# Patient Record
Sex: FEMALE | Race: WHITE | NOT HISPANIC OR LATINO | Employment: OTHER | URBAN - METROPOLITAN AREA
[De-identification: names, ages, dates, MRNs, and addresses within clinical notes are randomized per-mention and may not be internally consistent; named-entity substitution may affect disease eponyms.]

---

## 2020-01-15 ENCOUNTER — TRANSCRIBE ORDERS (OUTPATIENT)
Dept: ADMINISTRATIVE | Facility: HOSPITAL | Age: 55
End: 2020-01-15

## 2020-01-15 DIAGNOSIS — M81.0 OSTEOPOROSIS, SENILE: Primary | ICD-10-CM

## 2020-01-15 DIAGNOSIS — Z12.39 BREAST SCREENING: ICD-10-CM

## 2020-01-15 DIAGNOSIS — R92.2 DENSE BREAST: ICD-10-CM

## 2020-02-11 ENCOUNTER — HOSPITAL ENCOUNTER (OUTPATIENT)
Dept: ULTRASOUND IMAGING | Facility: HOSPITAL | Age: 55
Discharge: HOME | End: 2020-02-11

## 2020-02-11 ENCOUNTER — HOSPITAL ENCOUNTER (OUTPATIENT)
Dept: MAMMOGRAPHY | Facility: HOSPITAL | Age: 55
Discharge: HOME | End: 2020-02-11
Admitting: PHYSICIAN ASSISTANT

## 2020-02-11 ENCOUNTER — APPOINTMENT (OUTPATIENT)
Dept: BONE DENSITY | Facility: HOSPITAL | Age: 55
End: 2020-02-11

## 2020-02-11 DIAGNOSIS — M81.0 OSTEOPOROSIS, SENILE: ICD-10-CM

## 2020-02-11 DIAGNOSIS — Z12.39 BREAST SCREENING: ICD-10-CM

## 2020-02-11 DIAGNOSIS — R92.2 DENSE BREAST: ICD-10-CM

## 2020-02-11 PROCEDURE — 76641 ULTRASOUND BREAST COMPLETE: CPT

## 2020-02-11 PROCEDURE — 77067 SCR MAMMO BI INCL CAD: CPT

## 2020-02-11 PROCEDURE — 77080 DXA BONE DENSITY AXIAL: CPT

## 2020-02-11 PROCEDURE — 77063 BREAST TOMOSYNTHESIS BI: CPT

## 2020-12-22 ENCOUNTER — TELEPHONE (OUTPATIENT)
Dept: GASTROENTEROLOGY | Facility: CLINIC | Age: 55
End: 2020-12-22

## 2020-12-30 ENCOUNTER — PREP FOR SURGERY (OUTPATIENT)
Dept: OTHER | Facility: HOSPITAL | Age: 55
End: 2020-12-30

## 2020-12-30 DIAGNOSIS — Z12.11 ENCOUNTER FOR SCREENING FOR MALIGNANT NEOPLASM OF COLON: Primary | ICD-10-CM

## 2020-12-30 NOTE — TELEPHONE ENCOUNTER
RETURN CALL FROM PATIENT.  SCHEDULED AT [place] 04/23/2021 AT 9:30AM  - ARRIVE 8:30AM.  E-MAIL INSTRUCTIONS [email or web address].    COVID TEST ON 04/21/2021, WILL CALL WITH TIME.  NEED TO SELF QUARANTINE UNTIL AFTER PROCEDURE.  SHE UNDERSTANDS.

## 2020-12-31 PROBLEM — Z12.11 ENCOUNTER FOR SCREENING FOR MALIGNANT NEOPLASM OF COLON: Status: ACTIVE | Noted: 2020-12-31

## 2021-01-12 ENCOUNTER — TRANSCRIBE ORDERS (OUTPATIENT)
Dept: ADMINISTRATIVE | Facility: HOSPITAL | Age: 56
End: 2021-01-12

## 2021-01-12 DIAGNOSIS — R92.2 DENSE BREAST: Primary | ICD-10-CM

## 2021-02-12 ENCOUNTER — HOSPITAL ENCOUNTER (OUTPATIENT)
Dept: MAMMOGRAPHY | Facility: HOSPITAL | Age: 56
Discharge: HOME | End: 2021-02-12

## 2021-02-12 ENCOUNTER — HOSPITAL ENCOUNTER (OUTPATIENT)
Dept: ULTRASOUND IMAGING | Facility: HOSPITAL | Age: 56
Discharge: HOME | End: 2021-02-12

## 2021-02-12 DIAGNOSIS — R92.2 DENSE BREAST: ICD-10-CM

## 2021-02-12 PROCEDURE — 76641 ULTRASOUND BREAST COMPLETE: CPT

## 2021-02-12 PROCEDURE — G0279 TOMOSYNTHESIS, MAMMO: HCPCS

## 2021-02-12 PROCEDURE — 77066 DX MAMMO INCL CAD BI: CPT

## 2021-04-06 ENCOUNTER — TRANSCRIBE ORDERS (OUTPATIENT)
Dept: LAB | Facility: HOSPITAL | Age: 56
End: 2021-04-06

## 2021-04-06 DIAGNOSIS — Z01.818 PREOP EXAMINATION: Primary | ICD-10-CM

## 2021-04-21 ENCOUNTER — LAB (OUTPATIENT)
Dept: LAB | Facility: HOSPITAL | Age: 56
End: 2021-04-21

## 2021-04-21 DIAGNOSIS — Z01.818 PREOP EXAMINATION: ICD-10-CM

## 2021-04-21 LAB — SARS-COV-2 RNA PNL SPEC NAA+PROBE: NOT DETECTED

## 2021-04-21 PROCEDURE — C9803 HOPD COVID-19 SPEC COLLECT: HCPCS | Performed by: OBSTETRICS & GYNECOLOGY

## 2021-04-21 PROCEDURE — 87635 SARS-COV-2 COVID-19 AMP PRB: CPT | Performed by: OBSTETRICS & GYNECOLOGY

## 2021-04-22 ENCOUNTER — ANESTHESIA EVENT (OUTPATIENT)
Dept: PERIOP | Facility: HOSPITAL | Age: 56
End: 2021-04-22

## 2021-04-23 ENCOUNTER — HOSPITAL ENCOUNTER (OUTPATIENT)
Facility: HOSPITAL | Age: 56
Setting detail: HOSPITAL OUTPATIENT SURGERY
Discharge: HOME | End: 2021-04-23
Attending: INTERNAL MEDICINE | Admitting: INTERNAL MEDICINE

## 2021-04-23 ENCOUNTER — ANESTHESIA (OUTPATIENT)
Dept: PERIOP | Facility: HOSPITAL | Age: 56
End: 2021-04-23

## 2021-04-23 VITALS
BODY MASS INDEX: 20.89 KG/M2 | RESPIRATION RATE: 16 BRPM | OXYGEN SATURATION: 98 % | HEART RATE: 63 BPM | TEMPERATURE: 98 F | SYSTOLIC BLOOD PRESSURE: 112 MMHG | HEIGHT: 66 IN | DIASTOLIC BLOOD PRESSURE: 63 MMHG | WEIGHT: 130 LBS

## 2021-04-23 DIAGNOSIS — Z12.11 ENCOUNTER FOR SCREENING FOR MALIGNANT NEOPLASM OF COLON: ICD-10-CM

## 2021-04-23 PROCEDURE — 25010000002 PROPOFOL 10 MG/ML EMULSION: Performed by: NURSE ANESTHETIST, CERTIFIED REGISTERED

## 2021-04-23 PROCEDURE — 88305 TISSUE EXAM BY PATHOLOGIST: CPT | Performed by: INTERNAL MEDICINE

## 2021-04-23 PROCEDURE — 45380 COLONOSCOPY AND BIOPSY: CPT | Performed by: INTERNAL MEDICINE

## 2021-04-23 PROCEDURE — 45385 COLONOSCOPY W/LESION REMOVAL: CPT | Performed by: INTERNAL MEDICINE

## 2021-04-23 RX ORDER — GLYCOPYRROLATE 0.2 MG/ML
INJECTION INTRAMUSCULAR; INTRAVENOUS AS NEEDED
Status: DISCONTINUED | OUTPATIENT
Start: 2021-04-23 | End: 2021-04-23 | Stop reason: SURG

## 2021-04-23 RX ORDER — ESCITALOPRAM OXALATE 20 MG/1
20 TABLET ORAL DAILY
COMMUNITY

## 2021-04-23 RX ORDER — SODIUM CHLORIDE 0.9 % (FLUSH) 0.9 %
10 SYRINGE (ML) INJECTION EVERY 12 HOURS SCHEDULED
Status: DISCONTINUED | OUTPATIENT
Start: 2021-04-23 | End: 2021-04-23 | Stop reason: HOSPADM

## 2021-04-23 RX ORDER — LIDOCAINE HYDROCHLORIDE 20 MG/ML
INJECTION, SOLUTION INFILTRATION; PERINEURAL AS NEEDED
Status: DISCONTINUED | OUTPATIENT
Start: 2021-04-23 | End: 2021-04-23 | Stop reason: SURG

## 2021-04-23 RX ORDER — MAGNESIUM HYDROXIDE 1200 MG/15ML
LIQUID ORAL AS NEEDED
Status: DISCONTINUED | OUTPATIENT
Start: 2021-04-23 | End: 2021-04-23 | Stop reason: HOSPADM

## 2021-04-23 RX ORDER — SODIUM CHLORIDE 0.9 % (FLUSH) 0.9 %
10 SYRINGE (ML) INJECTION AS NEEDED
Status: DISCONTINUED | OUTPATIENT
Start: 2021-04-23 | End: 2021-04-23 | Stop reason: HOSPADM

## 2021-04-23 RX ORDER — PROPOFOL 10 MG/ML
VIAL (ML) INTRAVENOUS AS NEEDED
Status: DISCONTINUED | OUTPATIENT
Start: 2021-04-23 | End: 2021-04-23 | Stop reason: SURG

## 2021-04-23 RX ORDER — SODIUM CHLORIDE, SODIUM LACTATE, POTASSIUM CHLORIDE, CALCIUM CHLORIDE 600; 310; 30; 20 MG/100ML; MG/100ML; MG/100ML; MG/100ML
9 INJECTION, SOLUTION INTRAVENOUS CONTINUOUS PRN
Status: DISCONTINUED | OUTPATIENT
Start: 2021-04-23 | End: 2021-04-23 | Stop reason: HOSPADM

## 2021-04-23 RX ORDER — LIDOCAINE HYDROCHLORIDE 10 MG/ML
0.5 INJECTION, SOLUTION EPIDURAL; INFILTRATION; INTRACAUDAL; PERINEURAL ONCE AS NEEDED
Status: DISCONTINUED | OUTPATIENT
Start: 2021-04-23 | End: 2021-04-23 | Stop reason: HOSPADM

## 2021-04-23 RX ORDER — SODIUM CHLORIDE 9 MG/ML
40 INJECTION, SOLUTION INTRAVENOUS AS NEEDED
Status: DISCONTINUED | OUTPATIENT
Start: 2021-04-23 | End: 2021-04-23 | Stop reason: HOSPADM

## 2021-04-23 RX ADMIN — SODIUM CHLORIDE, POTASSIUM CHLORIDE, SODIUM LACTATE AND CALCIUM CHLORIDE 9 ML/HR: 600; 310; 30; 20 INJECTION, SOLUTION INTRAVENOUS at 08:53

## 2021-04-23 RX ADMIN — PROPOFOL 50 MG: 10 INJECTION, EMULSION INTRAVENOUS at 09:08

## 2021-04-23 RX ADMIN — PROPOFOL 50 MG: 10 INJECTION, EMULSION INTRAVENOUS at 09:21

## 2021-04-23 RX ADMIN — PROPOFOL 50 MG: 10 INJECTION, EMULSION INTRAVENOUS at 09:04

## 2021-04-23 RX ADMIN — PROPOFOL 50 MG: 10 INJECTION, EMULSION INTRAVENOUS at 09:25

## 2021-04-23 RX ADMIN — LIDOCAINE HYDROCHLORIDE 100 MG: 20 INJECTION, SOLUTION INFILTRATION; PERINEURAL at 09:03

## 2021-04-23 RX ADMIN — PROPOFOL 50 MG: 10 INJECTION, EMULSION INTRAVENOUS at 09:10

## 2021-04-23 RX ADMIN — PROPOFOL 50 MG: 10 INJECTION, EMULSION INTRAVENOUS at 09:17

## 2021-04-23 RX ADMIN — PROPOFOL 50 MG: 10 INJECTION, EMULSION INTRAVENOUS at 09:03

## 2021-04-23 RX ADMIN — PROPOFOL 50 MG: 10 INJECTION, EMULSION INTRAVENOUS at 09:13

## 2021-04-23 RX ADMIN — GLYCOPYRROLATE 0.2 MG: 0.2 INJECTION INTRAMUSCULAR; INTRAVENOUS at 09:03

## 2021-04-23 NOTE — ANESTHESIA PREPROCEDURE EVALUATION
Anesthesia Evaluation     Patient summary reviewed and Nursing notes reviewed   history of anesthetic complications: PONV  NPO Solid Status: > 8 hours  NPO Liquid Status: > 8 hours           Airway   Mallampati: II  TM distance: <3 FB  Neck ROM: full  No difficulty expected  Dental - normal exam     Pulmonary - negative pulmonary ROS and normal exam   Cardiovascular - normal exam  Exercise tolerance: good (4-7 METS)    (+) valvular problems/murmurs murmur,       Neuro/Psych- negative ROS  GI/Hepatic/Renal/Endo    (+)  GERD poorly controlled,      Musculoskeletal (-) negative ROS    Abdominal  - normal exam   Substance History - negative use     OB/GYN negative ob/gyn ROS         Other      history of cancer remission                    Anesthesia Plan    ASA 2     MAC     intravenous induction     Anesthetic plan, all risks, benefits, and alternatives have been provided, discussed and informed consent has been obtained with: patient.  Use of blood products discussed with patient  Consented to blood products.

## 2021-04-23 NOTE — ANESTHESIA POSTPROCEDURE EVALUATION
Patient: Taras Tovar    Procedure Summary     Date: 04/23/21 Room / Location: [facility] ENDOSCOPY 1 /  [facility] OR    Anesthesia Start: 0901 Anesthesia Stop: 0929    Procedure: COLONOSCOPY with polypectomy (N/A ) Diagnosis:       Encounter for screening for malignant neoplasm of colon      Colon polyp      Diverticulosis large intestine w/o perforation or abscess w/bleeding      (Encounter for screening for malignant neoplasm of colon [Z12.11])    Surgeons: Cr Morrison MD Provider: Roque Calvillo CRNA    Anesthesia Type: MAC ASA Status: 2          Anesthesia Type: MAC    Vitals  No vitals data found for the desired time range.          Post Anesthesia Care and Evaluation    Patient location during evaluation: bedside  Patient participation: complete - patient participated  Level of consciousness: anxious  Pain score: 0  Pain management: adequate  Airway patency: patent  Anesthetic complications: No anesthetic complications  PONV Status: none  Cardiovascular status: acceptable  Respiratory status: acceptable  Hydration status: acceptable

## 2021-04-23 NOTE — H&P
Patient Care Team:  Jarrell Gutierrez DO as PCP - General (Family Medicine)    CHIEF COMPLAINT: Screening CRC    HISTORY OF PRESENT ILLNESS:  First exam and no family history    No past medical history on file.  Past Surgical History:   Procedure Laterality Date   • BREAST BIOPSY Right     1990s     Family History   Problem Relation Age of Onset   • Breast cancer Maternal Aunt    • Breast cancer Paternal Aunt      Social History     Tobacco Use   • Smoking status: Not on file   Substance Use Topics   • Alcohol use: Not on file   • Drug use: Not on file     Medications Prior to Admission   Medication Sig Dispense Refill Last Dose   • butalbital-acetaminophen-caffeine (FIORICET, ESGIC) -40 MG per tablet TAKE ONE TABLET BY MOUTH TWICE DAILY AS NEEDED 45 tablet 0      Allergies:  Patient has no allergy information on record.    REVIEW OF SYSTEMS:  Please see the above history of present illness for pertinent positives and negatives.  The remainder of the patient's systems have been reviewed and are negative.     Vital Signs            Physical Exam:  Physical Exam   Constitutional: Patient appears well-developed and well-nourished and in no acute distress   HEENT:   Head: Normocephalic and atraumatic.   Eyes:  Pupils are equal, round, and reactive to light. EOM are intact. Sclerae are anicteric and non-injected.  Mouth and Throat: Patient has moist mucous membranes. Oropharynx is clear of any erythema or exudate.     Neck: Neck supple. No JVD present. No thyromegaly present. No lymphadenopathy present.  Cardiovascular: Regular rate, regular rhythm, S1 normal and S2 normal.  Exam reveals no gallop and no friction rub.  No murmur heard.  Pulmonary/Chest: Lungs are clear to auscultation bilaterally. No respiratory distress. No wheezes. No rhonchi. No rales.   Abdominal: Soft. Bowel sounds are normal. No distension and no mass. There is no hepatosplenomegaly. There is no tenderness.   Musculoskeletal: Normal Muscle  tone  Extremities: No edema. Pulses are palpable in all 4 extremities.  Neurological: Patient is alert and oriented to person, place, and time. Cranial nerves II-XII are grossly intact with no focal deficits.  Skin: Skin is warm. No rash noted. Nails show no clubbing.  No cyanosis or erythema.    Debilities/Disabilities Identified: None  Emotional Behavior: Appropriate     Results Review:   I reviewed the patient's new clinical results.    Lab Results (most recent)     None          Imaging Results (Most Recent)     None        reviewed    ECG/EMG Results (most recent)     None        reviewed    Assessment/Plan   Screening CRC/  colonoscopy      I discussed the patient's findings and my recommendations with patient.     Cr Morrison MD  04/23/21  08:32 EDT    Time: 10 min prior to procedure.

## 2021-04-23 NOTE — BRIEF OP NOTE
COLONOSCOPY  Progress Note    Taras Tovar  4/23/2021    Pre-op Diagnosis:   Encounter for screening for malignant neoplasm of colon [Z12.11]       Post-Op Diagnosis Codes:     * Encounter for screening for malignant neoplasm of colon [Z12.11]     * Colon polyp [K63.5]     * Diverticulosis large intestine w/o perforation or abscess w/bleeding [K57.31]    Procedure/CPT® Codes:        Procedure(s):  COLONOSCOPY with polypectomy    Surgeon(s):  Cr Morrison MD    Anesthesia: Monitored Anesthesia Care    Staff:   Circulator: Rupal Yeung RN  Scrub Person: Yudi Arias         Estimated Blood Loss: none    Urine Voided: * No values recorded between 4/23/2021  8:59 AM and 4/23/2021  9:26 AM *    Specimens:                Specimens     ID Source Type Tests Collected By Collected At Frozen?    A Large Intestine, Cecum Polyp · TISSUE PATHOLOGY EXAM   Cr Morrison MD 4/23/21 0916     Description: cecal polyp    This specimen was not marked as sent.    B Large Intestine, Right / Ascending Colon Polyp · TISSUE PATHOLOGY EXAM   Cr Morrison MD 4/23/21 0920     Description: ascending colon polyp    This specimen was not marked as sent.    C Large Intestine, Transverse Colon Polyp · TISSUE PATHOLOGY EXAM   Cr Morrison MD 4/23/21 0923     Description: transverse colon polyp    This specimen was not marked as sent.                Drains: * No LDAs found *    Findings: Colon to TI good Prep  Pan-Colonic Diverticulosis  Polyps-3-Cold snare,Biopsy    Complications: none          Cr Morrison MD     Date: 4/23/2021  Time: 09:31 EDT

## 2021-04-23 NOTE — OP NOTE
COLONOSCOPY  Procedure Report    Patient Name:  Taras Tovar  YOB: 1965    Date of Surgery:  4/23/2021     Indications:  Encounter for screening for malignant neoplasm of colon [Z12.11]      Pre-op Diagnosis:   Encounter for screening for malignant neoplasm of colon [Z12.11]    Post-Op Diagnosis Codes:     * Encounter for screening for malignant neoplasm of colon [Z12.11]     * Colon polyp [K63.5]     * Diverticulosis large intestine w/o perforation or abscess w/bleeding [K57.31]         Procedure/CPT® Codes:      Procedure(s):  COLONOSCOPY with polypectomy    Staff:  Surgeon(s):  Cr Morrison MD         Anesthesia: Monitored Anesthesia Care    Estimated Blood Loss: none    Specimens:   ID Type Source Tests Collected by Time   A (Not marked as sent) : cecal polyp Polyp Large Intestine, Cecum TISSUE PATHOLOGY EXAM Cr Morrison MD 4/23/2021 0916   B (Not marked as sent) : ascending colon polyp Polyp Large Intestine, Right / Ascending Colon TISSUE PATHOLOGY EXAM Cr Morrison MD 4/23/2021 0920   C (Not marked as sent) : transverse colon polyp Polyp Large Intestine, Transverse Colon TISSUE PATHOLOGY EXAM Cr Morrison MD 4/23/2021 0923       Implants:    Nothing was implanted during the procedure      Description of Procedure: After having signed informed consent, she was brought to the endoscopy suite and placed in left lateral decubitus position and given her IV sedation.  Rectal exam revealed no external lesions, normal anal tone, no rectal mass.  Scope was introduced into the rectum and advanced under direct visualization with ease through the rectum, sigmoid colon, passed multiple diverticular openings.  These extended throughout the colon.  Scope was advanced through the descending colon, to and around the splenic flexure, reduced, advanced to the transverse colon, with some looping to and around the hepatic flexure, reduced into the  ascending colon, advanced easily into the cecum.  Cecum was identified by appendiceal orifice and ileocecal valve.  It was fairly well prepped.  The residual bowel contents could be flushed and easily aspirated.  The ileocecal valve was identified, intubated.  Terminal ileum was normal appearing.  Scope was withdrawn back into the ascending colon and advanced to the cecum.  There was ball-shaped 3-4 mm polyp noted, biopsied, felt to be completely removed, sent separately as cecal polyp.  Scope was withdrawn in the ascending colon, examining the colon in circumferential fashion.  There was a sessile 9-10 x 6 mm polyp noted, it was removed with cold snare technique, sent separately as ascending colon polyp.  The scope was withdrawn through the remainder of the ascending colon, hepatic flexure, and to the distal transverse colon.  In the distal transverse, there was a diminutive 4 mm polyp that was biopsied, felt to be completely removed, sent separately as transverse colon polyp.  Scope was withdrawn through the remainder of the colon.  No further mucosal lesions were noted.  It was noted that diverticulum did extend over to the right colon.  The scope was retroflexed within the rectum revealing intact dentate line, no mucosal lesions.  The scope was deretroflexed and withdrawn.  The patient tolerated the procedure very well.          Findings: Colon to TI good Prep  Pan-Colonic Diverticulosis  Polyps-3-Cold snare,Biopsy       Complications: None    Recommendations: Results to be called.      Cr Morrison MD     Date: 4/23/2021  Time: 09:31 EDT

## 2021-04-23 NOTE — ANESTHESIA POSTPROCEDURE EVALUATION
Patient: Taras Tovar    Procedure Summary     Date: 04/23/21 Room / Location: [facility] ENDOSCOPY 1 /  [facility] OR    Anesthesia Start: 0901 Anesthesia Stop: 0929    Procedure: COLONOSCOPY with polypectomy (N/A ) Diagnosis:       Encounter for screening for malignant neoplasm of colon      Colon polyp      Diverticulosis large intestine w/o perforation or abscess w/bleeding      (Encounter for screening for malignant neoplasm of colon [Z12.11])    Surgeons: Cr Morrison MD Provider: Roque Calvillo CRNA    Anesthesia Type: MAC ASA Status: 2          Anesthesia Type: MAC    Vitals  No vitals data found for the desired time range.          Post Anesthesia Care and Evaluation    Patient location during evaluation: bedside  Patient participation: complete - patient participated  Level of consciousness: awake and alert  Pain score: 0  Pain management: adequate  Airway patency: patent  Anesthetic complications: No anesthetic complications  PONV Status: none  Cardiovascular status: acceptable  Respiratory status: acceptable  Hydration status: acceptable

## 2021-04-27 LAB
CYTO UR: NORMAL
LAB AP CASE REPORT: NORMAL
PATH REPORT.FINAL DX SPEC: NORMAL
PATH REPORT.GROSS SPEC: NORMAL

## 2021-07-22 ENCOUNTER — TRANSCRIBE ORDERS (OUTPATIENT)
Dept: ADMINISTRATIVE | Facility: HOSPITAL | Age: 56
End: 2021-07-22

## 2021-07-22 DIAGNOSIS — R92.2 DENSE BREAST: Primary | ICD-10-CM

## 2021-07-29 ENCOUNTER — APPOINTMENT (OUTPATIENT)
Dept: ULTRASOUND IMAGING | Facility: HOSPITAL | Age: 56
End: 2021-07-29

## 2021-08-06 ENCOUNTER — HOSPITAL ENCOUNTER (OUTPATIENT)
Dept: ULTRASOUND IMAGING | Facility: HOSPITAL | Age: 56
Discharge: HOME | End: 2021-08-06
Admitting: PHYSICIAN ASSISTANT

## 2021-08-06 DIAGNOSIS — R92.2 DENSE BREAST: ICD-10-CM

## 2021-08-06 PROCEDURE — 76641 ULTRASOUND BREAST COMPLETE: CPT

## 2021-08-23 ENCOUNTER — TELEPHONE (OUTPATIENT)
Dept: ORTHOPEDIC SURGERY | Facility: CLINIC | Age: 56
End: 2021-08-23

## 2021-08-23 ENCOUNTER — OFFICE VISIT (OUTPATIENT)
Dept: ORTHOPEDIC SURGERY | Facility: CLINIC | Age: 56
End: 2021-08-23

## 2021-08-23 VITALS
WEIGHT: 133 LBS | HEART RATE: 76 BPM | BODY MASS INDEX: 21.38 KG/M2 | SYSTOLIC BLOOD PRESSURE: 112 MMHG | DIASTOLIC BLOOD PRESSURE: 63 MMHG | HEIGHT: 66 IN

## 2021-08-23 DIAGNOSIS — M75.22 BICEPS TENDINITIS OF LEFT UPPER EXTREMITY: ICD-10-CM

## 2021-08-23 DIAGNOSIS — M70.52 PATELLAR BURSITIS OF LEFT KNEE: ICD-10-CM

## 2021-08-23 DIAGNOSIS — S80.02XA CONTUSION OF LEFT KNEE, INITIAL ENCOUNTER: ICD-10-CM

## 2021-08-23 DIAGNOSIS — S40.012A CONTUSION OF LEFT SHOULDER, INITIAL ENCOUNTER: ICD-10-CM

## 2021-08-23 DIAGNOSIS — G89.29 OTHER CHRONIC PAIN: Primary | ICD-10-CM

## 2021-08-23 DIAGNOSIS — S80.01XA CONTUSION OF RIGHT KNEE, INITIAL ENCOUNTER: ICD-10-CM

## 2021-08-23 PROCEDURE — 99204 OFFICE O/P NEW MOD 45 MIN: CPT | Performed by: ORTHOPAEDIC SURGERY

## 2021-08-23 PROCEDURE — 73030 X-RAY EXAM OF SHOULDER: CPT | Performed by: ORTHOPAEDIC SURGERY

## 2021-08-23 PROCEDURE — 73562 X-RAY EXAM OF KNEE 3: CPT | Performed by: ORTHOPAEDIC SURGERY

## 2021-08-23 RX ORDER — MELOXICAM 7.5 MG/1
7.5 TABLET ORAL DAILY
Qty: 30 TABLET | Refills: 5 | Status: SHIPPED | OUTPATIENT
Start: 2021-08-23 | End: 2021-08-23

## 2021-08-23 RX ORDER — METHYLPREDNISOLONE 4 MG/1
TABLET ORAL
Qty: 21 TABLET | Refills: 0 | Status: SHIPPED | OUTPATIENT
Start: 2021-08-23 | End: 2022-11-08

## 2021-08-23 RX ORDER — MAGNESIUM CARB/ALUMINUM HYDROX 105-160MG
TABLET,CHEWABLE ORAL ONCE
COMMUNITY
End: 2023-01-10

## 2021-08-23 RX ORDER — FEXOFENADINE HCL 180 MG/1
180 TABLET ORAL DAILY
COMMUNITY

## 2021-08-23 RX ORDER — IBUPROFEN 800 MG/1
800 TABLET ORAL 3 TIMES DAILY
Qty: 90 TABLET | Refills: 5 | Status: SHIPPED | OUTPATIENT
Start: 2021-08-23 | End: 2023-01-10

## 2021-08-23 NOTE — PROGRESS NOTES
Subjective: Left shoulder bilateral knee pain     Patient ID: Taras Tovar is a 56 y.o. female.    Chief Complaint:    History of Present Illness 56-year-old female known to me as well as not been seen for over 6 years presents with a new problem involving her left shoulder and both knees.  Patient fell while playing pickle ball approximately 2 weeks ago landed on both knees and her left shoulder.  Patient fell for developing significant pain and discomfort in all 3 extremities.  Had an abrasion over the left shoulder and developed swelling and ecchymosis to the left leg from above the knee about mid thigh level down to almost the ankle with swelling over the patella itself.  Also has an abrasion over both patellae.  All 3 joints are better but still having pain or discomfort.  Has been taking ibuprofen, over-the-counter daily without any GI side effect but presents today for evaluation for sure there is no fracture or surgical pathology involved.       Social History     Occupational History   • Not on file   Tobacco Use   • Smoking status: Former Smoker     Packs/day: 0.50     Types: Cigarettes   • Smokeless tobacco: Never Used   Vaping Use   • Vaping Use: Never used   Substance and Sexual Activity   • Alcohol use: Never   • Drug use: Never   • Sexual activity: Defer      Review of Systems   Constitutional: Negative for chills, diaphoresis, fever and unexpected weight change.   HENT: Negative for hearing loss, nosebleeds, sore throat and tinnitus.    Eyes: Negative for pain and visual disturbance.   Respiratory: Negative for cough, shortness of breath and wheezing.    Cardiovascular: Negative for chest pain and palpitations.   Gastrointestinal: Negative for abdominal pain, diarrhea, nausea and vomiting.   Endocrine: Negative for cold intolerance, heat intolerance and polydipsia.   Genitourinary: Negative for difficulty urinating, dysuria and hematuria.   Musculoskeletal: Positive for arthralgias and myalgias.  Negative for joint swelling.   Skin: Negative for rash and wound.   Allergic/Immunologic: Negative for environmental allergies.   Neurological: Negative for dizziness, syncope and numbness.   Hematological: Does not bruise/bleed easily.   Psychiatric/Behavioral: Negative for dysphoric mood and sleep disturbance. The patient is not nervous/anxious.          Past Medical History:   Diagnosis Date   • Bell's palsy    • Fracture of wrist    • Fracture, radius    • Fracture, ulna    • PONV (postoperative nausea and vomiting)      Past Surgical History:   Procedure Laterality Date   • BREAST BIOPSY Right     1990s   • BREAST LUMPECTOMY Right    • COLONOSCOPY W/ POLYPECTOMY N/A 4/23/2021    Procedure: COLONOSCOPY with polypectomy;  Surgeon: Cr Morrison MD;  Location: [facility];  Service: Gastroenterology;  Laterality: N/A;  cecal polyp  ascending colon polyp (cold snare)  transverse colon polyp  diverticulosis   • HYSTERECTOMY       Family History   Problem Relation Age of Onset   • Breast cancer Maternal Aunt    • Breast cancer Paternal Aunt          Objective:  Vitals:    08/23/21 1306   BP: 112/63   Pulse: 76         08/23/21  1306   Weight: 60.3 kg (133 lb)     Body mass index is 21.47 kg/m².        Ortho Exam   AP lateral outlet view of the left shoulder does not show any acute or chronic changes.  Mild AC arthritis otherwise negative.  AP lateral sunrise views of both knees show some mild patellofemoral symptoms as well as decrease in the joint space but no spurring noted no acute changes noted on either knee.  AP show no significant decrease in the joint spaces.  She is alert and oriented x3.  Head is normocephalic and sclerae clear.  With regard to the left upper extremity there is no motor or sensory deficits with regard to the radial, ulnar or median nerve.  She has good capillary refill.  She has full range of motion of the shoulders flexion extension pronation supination.  There is some  tenderness over the coronoid process and there is minimal discomfort with flexion of the elbow against resistance.  She can abduct and extend about 170 degrees and 90 degrees with mild discomfort again over the coronoid process.  Abduction extension and 90 degrees against resistance causes minimal pain.  Mildly positive Horvath sign.  Negative Speed test.  Mild pain with adduction of the shoulder but a negative De Witt's test.  External rotation is approximately 40 degrees and internal rotation is to level of L1.  With regard to the knees the left knee does show evidence of prepatella bursitis probably hemorrhagic bursitis that is resolving.  Is no erythema the skin is intact.  There is ecchymosis from just above the knee to the calf there is marked tenderness but a negative Homans' sign.  Knee range of motion which is 0 to about 100 degrees of the left knee.  Is tenderness posteriorly and there is joint line tenderness but negative Facundo's.  No instability throughout the arc of motion.  She has good capillary refill.  No effusion noted to the knee itself.  The ecchymosis or bruising is subcutaneous.  With regard to the right knee she has 0 to 125 degrees of motion with no instability throughout the arc of motion.  Generalized tenderness particularly with patellar compression but there is no crepitus noted.  Mild joint line tenderness with negative Facundo's.  Calf is minimally tender.  Negative Homans.  She has good distal pulses good capillary refill the skin is cool to touch.    Assessment:        1. Other chronic pain    2. Contusion of left shoulder, initial encounter    3. Biceps tendinitis of left upper extremity    4. Contusion of left knee, initial encounter    5. Patellar bursitis of left knee    6. Contusion of right knee, initial encounter           Plan: Reviewed the patient's history x-rays and physical exam with the patient and her .  She has significant contusions to both the shoulder and  both knees.  No evidence of any surgical pathology in the form of rotator cuff tears biceps tendon tears meniscal tear or ligament injury to the knees.  She has significant contusion to the knees as far as articular surface with the patella bursitis and bruising of the shoulder with the biceps tendinitis.  After reviewing treatment options and we will start her on a steroid pack followed by meloxicam 7.5 daily.  Return to see me in 3 weeks.  Advised heat application to all 3 affected joints.  Answered all questions            Work Status:    ELIZABETH query complete.    Orders:  Orders Placed This Encounter   Procedures   • XR Knee 3 View Bilateral   • XR Shoulder 2+ View Left       Medications:  New Medications Ordered This Visit   Medications   • methylPREDNISolone (MEDROL) 4 MG dose pack     Sig: Use as directed by package instructions     Dispense:  21 tablet     Refill:  0   • meloxicam (MOBIC) 7.5 MG tablet     Sig: Take 1 tablet by mouth Daily.     Dispense:  30 tablet     Refill:  5       Followup:  Return in about 3 weeks (around 9/13/2021).          Dictated utilizing Dragon dictation

## 2021-08-23 NOTE — TELEPHONE ENCOUNTER
Please send in Ibuprofen 800mg TID PRN in place of the Mobic 7.5mg RX that was sent in.    Thanks.

## 2021-09-24 RX ORDER — IBUPROFEN 800 MG/1
800 TABLET ORAL 3 TIMES DAILY
Qty: 270 TABLET | Refills: 0 | Status: CANCELLED | OUTPATIENT
Start: 2021-09-24

## 2021-09-24 NOTE — TELEPHONE ENCOUNTER
Patient aware and will check with [facility] and see if this has been done and if so she will fax it over if not she will call us back and request an order be placed.

## 2021-09-24 NOTE — TELEPHONE ENCOUNTER
I do not see with the patient had a CBC or CMP recently.  She needs that done before the medication can be authorized.  If she has had a done by her primary care physician if those results are normal I will authorize

## 2021-09-24 NOTE — TELEPHONE ENCOUNTER
[facility] requesting a 90 RX of Ibuprofen 800mg -1 po TID PRN.    DX:  1. Other chronic pain    2. Contusion of left shoulder, initial encounter    3. Biceps tendinitis of left upper extremity    4. Contusion of left knee, initial encounter    5. Patellar bursitis of left knee    6. Contusion of right knee, initial encounter

## 2022-01-14 ENCOUNTER — TRANSCRIBE ORDERS (OUTPATIENT)
Dept: ADMINISTRATIVE | Facility: HOSPITAL | Age: 57
End: 2022-01-14

## 2022-01-14 DIAGNOSIS — Z12.31 SCREENING MAMMOGRAM, ENCOUNTER FOR: Primary | ICD-10-CM

## 2022-01-17 ENCOUNTER — TRANSCRIBE ORDERS (OUTPATIENT)
Dept: ADMINISTRATIVE | Facility: HOSPITAL | Age: 57
End: 2022-01-17

## 2022-01-17 DIAGNOSIS — M81.0 SENILE OSTEOPOROSIS: Primary | ICD-10-CM

## 2022-01-21 ENCOUNTER — TRANSCRIBE ORDERS (OUTPATIENT)
Dept: ADMINISTRATIVE | Facility: HOSPITAL | Age: 57
End: 2022-01-21

## 2022-01-21 DIAGNOSIS — Z12.31 VISIT FOR SCREENING MAMMOGRAM: Primary | ICD-10-CM

## 2022-02-15 ENCOUNTER — HOSPITAL ENCOUNTER (OUTPATIENT)
Dept: MAMMOGRAPHY | Facility: HOSPITAL | Age: 57
Discharge: HOME | End: 2022-02-15

## 2022-02-15 ENCOUNTER — APPOINTMENT (OUTPATIENT)
Dept: BONE DENSITY | Facility: HOSPITAL | Age: 57
End: 2022-02-15

## 2022-02-15 DIAGNOSIS — M81.0 SENILE OSTEOPOROSIS: ICD-10-CM

## 2022-02-15 DIAGNOSIS — Z12.31 VISIT FOR SCREENING MAMMOGRAM: ICD-10-CM

## 2022-02-15 PROCEDURE — 77080 DXA BONE DENSITY AXIAL: CPT

## 2022-02-15 PROCEDURE — 77063 BREAST TOMOSYNTHESIS BI: CPT

## 2022-02-15 PROCEDURE — 77067 SCR MAMMO BI INCL CAD: CPT

## 2022-06-21 ENCOUNTER — TELEPHONE (OUTPATIENT)
Dept: ORTHOPEDIC SURGERY | Facility: CLINIC | Age: 57
End: 2022-06-21

## 2022-06-21 NOTE — TELEPHONE ENCOUNTER
"[facility] requesting a 90 RX of Ibuprofen 800mg -1 po TID PRN.     DX:  1. Other chronic pain    2. Contusion of left shoulder, initial encounter    3. Biceps tendinitis of left upper extremity    4. Contusion of left knee, initial encounter    5. Patellar bursitis of left knee    6. Contusion of right knee, initial encounter         Per Dr. Vaughan-  \"I do not see with the patient had a CBC or CMP recently.  She needs that done before the medication can be authorized.  If she has had a done by her primary care physician if those results are normal I will authorize.\"    Voicemail left for patient.  "

## 2022-07-13 ENCOUNTER — TELEPHONE (OUTPATIENT)
Dept: GASTROENTEROLOGY | Facility: CLINIC | Age: 57
End: 2022-07-13

## 2022-07-13 RX ORDER — SULFAMETHOXAZOLE AND TRIMETHOPRIM 800; 160 MG/1; MG/1
1 TABLET ORAL 2 TIMES DAILY
Qty: 28 TABLET | Refills: 3 | Status: SHIPPED | OUTPATIENT
Start: 2022-07-13 | End: 2022-11-08

## 2022-07-13 NOTE — TELEPHONE ENCOUNTER
Pt called stated that she has been having a diverticulitis flare up for that last 10 days, she is having abdominal pain, when she has a bm it feel a little better but still painful.  Would like antibiotic called in.

## 2022-07-13 NOTE — TELEPHONE ENCOUNTER
"Last C/S 4/23/21:  Diverticulosis; no diverticulitis.  No antibiotic RX since on Epic.  No OVs since on [facility].  Patient states NO fever or chills.  \"Mushy\", tan stool.  LLQ pain getting worse.  Tests or ER?    "

## 2022-07-13 NOTE — TELEPHONE ENCOUNTER
Patient advised regarding Bactrim.  Sched appt 11/8/22.  Advised to call back if no improvement with antibiotic or if symptoms recur prior to appt.

## 2022-08-18 ENCOUNTER — TELEPHONE (OUTPATIENT)
Dept: GASTROENTEROLOGY | Facility: CLINIC | Age: 57
End: 2022-08-18

## 2022-08-18 DIAGNOSIS — R19.7 DIARRHEA OF PRESUMED INFECTIOUS ORIGIN: Primary | ICD-10-CM

## 2022-08-18 NOTE — TELEPHONE ENCOUNTER
Called spoke with  and pt on speaker.    Pt said she ate out last week few days later began to get sick. No sure food poisoning. Low Grade fever, diarrhea. Negative Covid test stated feels like could parasite.

## 2022-08-18 NOTE — TELEPHONE ENCOUNTER
PT CALLED STATED THAT SHE HAS BEEN HAVING SEVERE STOMACH ACHE, SOME DIARRHEA NO FEVER.  IT IS COMING AND GOING.      SENDING TO CLINICAL

## 2022-08-19 ENCOUNTER — LAB (OUTPATIENT)
Dept: LAB | Facility: HOSPITAL | Age: 57
End: 2022-08-19

## 2022-08-19 DIAGNOSIS — R19.7 DIARRHEA OF PRESUMED INFECTIOUS ORIGIN: ICD-10-CM

## 2022-08-20 ENCOUNTER — LAB (OUTPATIENT)
Dept: LAB | Facility: HOSPITAL | Age: 57
End: 2022-08-20

## 2022-08-20 LAB

## 2022-08-20 PROCEDURE — 87507 IADNA-DNA/RNA PROBE TQ 12-25: CPT

## 2022-08-22 ENCOUNTER — TELEPHONE (OUTPATIENT)
Dept: GASTROENTEROLOGY | Facility: CLINIC | Age: 57
End: 2022-08-22

## 2022-11-08 ENCOUNTER — OFFICE VISIT (OUTPATIENT)
Dept: GASTROENTEROLOGY | Facility: CLINIC | Age: 57
End: 2022-11-08

## 2022-11-08 VITALS
WEIGHT: 132.3 LBS | HEIGHT: 66 IN | SYSTOLIC BLOOD PRESSURE: 104 MMHG | DIASTOLIC BLOOD PRESSURE: 72 MMHG | BODY MASS INDEX: 21.26 KG/M2

## 2022-11-08 DIAGNOSIS — K57.90 DIVERTICULOSIS: ICD-10-CM

## 2022-11-08 DIAGNOSIS — R10.13 DYSPEPSIA: Primary | ICD-10-CM

## 2022-11-08 DIAGNOSIS — Z86.19 HISTORY OF HELICOBACTER PYLORI INFECTION: ICD-10-CM

## 2022-11-08 PROCEDURE — 99214 OFFICE O/P EST MOD 30 MIN: CPT | Performed by: INTERNAL MEDICINE

## 2022-11-08 RX ORDER — MOMETASONE FUROATE 50 UG/1
SPRAY, METERED NASAL
COMMUNITY

## 2022-11-08 RX ORDER — MULTIVITAMIN WITH IRON
TABLET ORAL
COMMUNITY
End: 2023-01-10

## 2022-11-08 RX ORDER — DENOSUMAB 60 MG/ML
INJECTION SUBCUTANEOUS
COMMUNITY
End: 2023-01-10

## 2022-11-08 RX ORDER — LANSOPRAZOLE 30 MG/1
30 CAPSULE, DELAYED RELEASE ORAL 2 TIMES DAILY
Qty: 180 CAPSULE | Refills: 3 | Status: SHIPPED | OUTPATIENT
Start: 2022-11-08

## 2022-11-08 NOTE — PROGRESS NOTES
PATIENT INFORMATION  Taras Tovar       - 1965    CHIEF COMPLAINT  Chief Complaint   Patient presents with   • Diverticulitis       HISTORY OF PRESENT ILLNESS  Despite her diverticulitis issues but she complains of epigastric discomfort that awakens her and is responsive to Prevacid and AA but worse with red sauces      REVIEWED PERTINENT RESULTS/ LABS  Lab Results   Component Value Date    CASEREPORT  2021     Surgical Pathology Report                         Case: [id number]                                  Authorizing Provider:  Cr Morrison        Collected:           2021 09:16 AM                                 MD Clayton                                                                   Ordering Location:     [facility]   Received:            2021 10:58 AM                                 OR                                                                           Pathologist:           Shirley Cantor MD                                                    Specimens:   1) - Large Intestine, Cecum, cecal polyp                                                            2) - Large Intestine, Right / Ascending Colon, ascending colon polyp                                3) - Large Intestine, Transverse Colon, transverse colon polyp                             FINALDX  2021     1. Cecum, Biopsy:    A. Fragment of tubular adenoma.   B. Negative for high-grade dysplasia.    2. Ascending Colon, Biopsy:   A. Tubular adenoma.   B. Negative for high-grade dysplasia.    3. Transverse Colon, Biopsy:   A. Tubular adenoma.   B. Negative for high-grade dysplasia.    swm/pkm        Lab Results   Component Value Date    HGB 12.8 2021    MCV 88.0 2021     2021    INR 1.0 2021      No results found.    REVIEW OF SYSTEMS  Review of Systems   Constitutional: Negative for activity change, chills, fever and unexpected weight change.   HENT:  Negative for congestion.    Eyes: Negative for visual disturbance.   Respiratory: Negative for shortness of breath.    Cardiovascular: Negative for chest pain and palpitations.   Gastrointestinal: Positive for abdominal distention, abdominal pain and constipation. Negative for blood in stool.   Endocrine: Negative for cold intolerance and heat intolerance.   Genitourinary: Negative for hematuria.   Musculoskeletal: Negative for gait problem.   Skin: Negative for color change.   Allergic/Immunologic: Negative for immunocompromised state.   Neurological: Negative for weakness and light-headedness.   Hematological: Negative for adenopathy.   Psychiatric/Behavioral: Negative for sleep disturbance. The patient is not nervous/anxious.          ACTIVE PROBLEMS  Patient Active Problem List    Diagnosis    • Diverticulosis [K57.90]    • Dyspepsia [R10.13]    • History of Helicobacter pylori infection [Z86.19]    • Encounter for screening for malignant neoplasm of colon [Z12.11]          PAST MEDICAL HISTORY  Past Medical History:   Diagnosis Date   • Bell's palsy    • Fracture of wrist    • Fracture, radius    • Fracture, ulna    • PONV (postoperative nausea and vomiting)          SURGICAL HISTORY  Past Surgical History:   Procedure Laterality Date   • BREAST BIOPSY Right     1990s   • BREAST LUMPECTOMY Right    • COLONOSCOPY W/ POLYPECTOMY N/A 4/23/2021    Procedure: COLONOSCOPY with polypectomy;  Surgeon: Cr Morrison MD;  Location: [facility];  Service: Gastroenterology;  Laterality: N/A;  cecal polyp  ascending colon polyp (cold snare)  transverse colon polyp  diverticulosis   • HYSTERECTOMY           FAMILY HISTORY  Family History   Problem Relation Age of Onset   • Breast cancer Maternal Aunt    • Breast cancer Paternal Aunt          SOCIAL HISTORY  Social History     Occupational History   • Not on file   Tobacco Use   • Smoking status: Former     Packs/day: 0.50     Types: Cigarettes   • Smokeless  "tobacco: Never   Vaping Use   • Vaping Use: Never used   Substance and Sexual Activity   • Alcohol use: Never   • Drug use: Never   • Sexual activity: Defer         CURRENT MEDICATIONS    Current Outpatient Medications:   •  butalbital-acetaminophen-caffeine (FIORICET, ESGIC) -40 MG per tablet, TAKE ONE TABLET BY MOUTH TWICE DAILY AS NEEDED, Disp: 45 tablet, Rfl: 0  •  calcium citrate-vitamin d (CALCITRATE) 315-250 MG-UNIT tablet tablet, Take  by mouth Daily., Disp: , Rfl:   •  cyanocobalamin (VITAMIN B-12) 1000 MCG tablet, Take  by mouth., Disp: , Rfl:   •  denosumab (Prolia) 60 MG/ML solution prefilled syringe syringe, Prolia 60 mg/mL subcutaneous syringe, Disp: , Rfl:   •  escitalopram (LEXAPRO) 20 MG tablet, Take 20 mg by mouth Daily., Disp: , Rfl:   •  fexofenadine (ALLEGRA) 180 MG tablet, Take 180 mg by mouth Daily., Disp: , Rfl:   •  ibuprofen (ADVIL,MOTRIN) 800 MG tablet, Take 1 tablet by mouth 3 (Three) Times a Day., Disp: 90 tablet, Rfl: 5  •  Magnesium 250 MG tablet, Take  by mouth., Disp: , Rfl:   •  magnesium citrate 1.745 GM/30ML solution solution, Take  by mouth 1 (One) Time., Disp: , Rfl:   •  mometasone (NASONEX) 50 MCG/ACT nasal spray, mometasone 50 mcg/actuation nasal spray, Disp: , Rfl:   •  ZINC CITRATE PO, Take  by mouth., Disp: , Rfl:   •  lansoprazole (PREVACID) 30 MG capsule, Take 1 capsule by mouth 2 (Two) Times a Day., Disp: 180 capsule, Rfl: 3    ALLERGIES  Penicillins    VITALS  Vitals:    11/08/22 0820   BP: 104/72   BP Location: Right arm   Patient Position: Sitting   Cuff Size: Adult   Weight: 60 kg (132 lb 4.8 oz)   Height: 167.6 cm (66\")       PHYSICAL EXAM  Debilities/Disabilities Identified: None  Emotional Behavior: Appropriate  Wt Readings from Last 3 Encounters:   11/08/22 60 kg (132 lb 4.8 oz)   01/03/22 61.2 kg (135 lb)   08/23/21 60.3 kg (133 lb)     Ht Readings from Last 1 Encounters:   11/08/22 167.6 cm (66\")     Body mass index is 21.35 kg/m².  Physical " Exam  Constitutional:       Appearance: She is well-developed. She is not diaphoretic.   HENT:      Head: Normocephalic and atraumatic.   Eyes:      General: No scleral icterus.     Conjunctiva/sclera: Conjunctivae normal.      Pupils: Pupils are equal, round, and reactive to light.   Neck:      Thyroid: No thyromegaly.   Cardiovascular:      Rate and Rhythm: Normal rate and regular rhythm.      Heart sounds: Normal heart sounds. No murmur heard.    No gallop.   Pulmonary:      Effort: Pulmonary effort is normal.      Breath sounds: Normal breath sounds. No wheezing or rales.   Abdominal:      General: Bowel sounds are normal. There is no distension or abdominal bruit.      Palpations: Abdomen is soft. There is no shifting dullness, fluid wave or mass.      Tenderness: There is abdominal tenderness in the right upper quadrant, epigastric area and left upper quadrant. There is no guarding. Negative signs include Baca's sign.      Hernia: There is no hernia in the ventral area.      Comments: Negative Baca's   Musculoskeletal:         General: Normal range of motion.      Cervical back: Normal range of motion and neck supple.   Lymphadenopathy:      Cervical: No cervical adenopathy.   Skin:     General: Skin is warm and dry.      Findings: No erythema or rash.   Neurological:      Mental Status: She is alert and oriented to person, place, and time.   Psychiatric:         Mood and Affect: Mood normal.         Behavior: Behavior normal.         CLINICAL DATA REVIEWED   reviewed previous lab results and integrated with today's visit, reviewed notes from other physicians and/or last GI encounter, reviewed previous endoscopy results and available photos, reviewed surgical pathology results from previous biopsies    ASSESSMENT  Diagnoses and all orders for this visit:    Dyspepsia  -     H. Pylori Antigen, Stool - Stool, Per Rectum    Diverticulosis    History of Helicobacter pylori infection    Other orders  -     ZINC  CITRATE PO; Take  by mouth.  -     cyanocobalamin (VITAMIN B-12) 1000 MCG tablet; Take  by mouth.  -     denosumab (Prolia) 60 MG/ML solution prefilled syringe syringe; Prolia 60 mg/mL subcutaneous syringe  -     Magnesium 250 MG tablet; Take  by mouth.  -     mometasone (NASONEX) 50 MCG/ACT nasal spray; mometasone 50 mcg/actuation nasal spray  -     lansoprazole (PREVACID) 30 MG capsule; Take 1 capsule by mouth 2 (Two) Times a Day.          PLAN  Return in about 2 months (around 1/8/2023).    I have discussed the above plan with the patient.  They verbalize understanding and are in agreement with the plan.  They have been advised to contact the office for any questions, concerns, or changes related to their health.

## 2023-01-10 ENCOUNTER — OFFICE VISIT (OUTPATIENT)
Dept: GASTROENTEROLOGY | Facility: CLINIC | Age: 58
End: 2023-01-10
Payer: COMMERCIAL

## 2023-01-10 VITALS
SYSTOLIC BLOOD PRESSURE: 108 MMHG | HEIGHT: 66 IN | WEIGHT: 132.5 LBS | BODY MASS INDEX: 21.29 KG/M2 | DIASTOLIC BLOOD PRESSURE: 64 MMHG

## 2023-01-10 DIAGNOSIS — Z86.010 PERSONAL HISTORY OF COLONIC POLYPS: ICD-10-CM

## 2023-01-10 DIAGNOSIS — R10.13 DYSPEPSIA: Primary | ICD-10-CM

## 2023-01-10 DIAGNOSIS — Z86.19 HISTORY OF HELICOBACTER PYLORI INFECTION: ICD-10-CM

## 2023-01-10 PROBLEM — Z86.0100 PERSONAL HISTORY OF COLONIC POLYPS: Status: ACTIVE | Noted: 2023-01-10

## 2023-01-10 PROCEDURE — 99213 OFFICE O/P EST LOW 20 MIN: CPT | Performed by: INTERNAL MEDICINE

## 2023-01-10 RX ORDER — OMEPRAZOLE 40 MG/1
40 CAPSULE, DELAYED RELEASE ORAL DAILY
Qty: 90 CAPSULE | Refills: 3 | Status: SHIPPED | OUTPATIENT
Start: 2023-01-10

## 2023-01-10 RX ORDER — POLYETHYLENE GLYCOL 3350 17 G/17G
17 POWDER, FOR SOLUTION ORAL DAILY PRN
COMMUNITY

## 2023-01-10 NOTE — PROGRESS NOTES
PATIENT INFORMATION  Taras Tovar       - 1965    CHIEF COMPLAINT  Chief Complaint   Patient presents with   • Follow-up     Diverticulitis; Epigastric pain       HISTORY OF PRESENT ILLNESS  Didn't Do her stool study and stopped her lanso due to Prevacid so switched to her husbands Omeprazoel and didn't call     But after a course of Doxy( sinus) felt better               REVIEWED PERTINENT RESULTS/ LABS  Lab Results   Component Value Date    CASEREPORT  2021     Surgical Pathology Report                         Case: [id number]                                  Authorizing Provider:  Cr Morrison        Collected:           2021 09:16 AM                                 MD Clayton                                                                   Ordering Location:     [facility]   Received:            2021 10:58 AM                                 OR                                                                           Pathologist:           Shirley Cantor MD                                                    Specimens:   1) - Large Intestine, Cecum, cecal polyp                                                            2) - Large Intestine, Right / Ascending Colon, ascending colon polyp                                3) - Large Intestine, Transverse Colon, transverse colon polyp                             FINALDX  2021     1. Cecum, Biopsy:    A. Fragment of tubular adenoma.   B. Negative for high-grade dysplasia.    2. Ascending Colon, Biopsy:   A. Tubular adenoma.   B. Negative for high-grade dysplasia.    3. Transverse Colon, Biopsy:   A. Tubular adenoma.   B. Negative for high-grade dysplasia.    swm/pkm        Lab Results   Component Value Date    HGB 12.8 2021    MCV 88.0 2021     2021    INR 1.0 2021      No results found.    REVIEW OF SYSTEMS  Review of Systems   Constitutional: Positive for chills, fatigue  and fever.   HENT: Positive for congestion, nosebleeds, postnasal drip, sinus pressure, sinus pain and sneezing.    Eyes: Negative.    Cardiovascular: Negative.    Gastrointestinal: Positive for constipation.   Endocrine: Negative.    Genitourinary: Negative.    Musculoskeletal: Negative.    Skin: Negative.    Allergic/Immunologic: Positive for environmental allergies.   Neurological: Positive for headaches.   Hematological: Negative.    Psychiatric/Behavioral: Negative.          ACTIVE PROBLEMS  Patient Active Problem List    Diagnosis    • Personal history of colonic polyps [Z86.010]    • Diverticulosis [K57.90]    • Dyspepsia [R10.13]    • History of Helicobacter pylori infection [Z86.19]    • Encounter for screening for malignant neoplasm of colon [Z12.11]          PAST MEDICAL HISTORY  Past Medical History:   Diagnosis Date   • Bell's palsy    • Fracture of wrist    • Fracture, radius    • Fracture, ulna    • PONV (postoperative nausea and vomiting)          SURGICAL HISTORY  Past Surgical History:   Procedure Laterality Date   • BREAST BIOPSY Right     1990s   • BREAST LUMPECTOMY Right    • COLONOSCOPY W/ POLYPECTOMY N/A 4/23/2021    Procedure: COLONOSCOPY with polypectomy;  Surgeon: Cr Morrison MD;  Location: [facility];  Service: Gastroenterology;  Laterality: N/A;  cecal polyp  ascending colon polyp (cold snare)  transverse colon polyp  diverticulosis   • HYSTERECTOMY           FAMILY HISTORY  Family History   Problem Relation Age of Onset   • Breast cancer Maternal Aunt    • Breast cancer Paternal Aunt          SOCIAL HISTORY  Social History     Occupational History   • Not on file   Tobacco Use   • Smoking status: Former     Packs/day: 0.50     Types: Cigarettes   • Smokeless tobacco: Never   Vaping Use   • Vaping Use: Never used   Substance and Sexual Activity   • Alcohol use: Never   • Drug use: Never   • Sexual activity: Defer         CURRENT MEDICATIONS    Current Outpatient  Medications:   •  butalbital-acetaminophen-caffeine (FIORICET, ESGIC) -40 MG per tablet, TAKE ONE TABLET BY MOUTH TWICE DAILY AS NEEDED, Disp: 45 tablet, Rfl: 0  •  cyanocobalamin (VITAMIN B-12) 1000 MCG tablet, Take  by mouth., Disp: , Rfl:   •  escitalopram (LEXAPRO) 20 MG tablet, Take 20 mg by mouth Daily., Disp: , Rfl:   •  fexofenadine (ALLEGRA) 180 MG tablet, Take 180 mg by mouth Daily., Disp: , Rfl:   •  mometasone (NASONEX) 50 MCG/ACT nasal spray, mometasone 50 mcg/actuation nasal spray, Disp: , Rfl:   •  polyethylene glycol (MiraLax) 17 GM/SCOOP powder, Take 17 g by mouth Daily As Needed. Average 3 times per week, Disp: , Rfl:   •  vitamin D3 125 MCG (5000 UT) capsule capsule, Take 5,000 Units by mouth Daily., Disp: , Rfl:   •  ZINC CITRATE PO, Take  by mouth., Disp: , Rfl:   •  lansoprazole (PREVACID) 30 MG capsule, Take 1 capsule by mouth 2 (Two) Times a Day., Disp: 180 capsule, Rfl: 3    ALLERGIES  Penicillins    VITALS  Vitals:    01/10/23 0831   BP: 108/64   BP Location: Right arm   Patient Position: Sitting   Cuff Size: Adult   Weight: 60.1 kg (132 lb 8 oz)   Height: 167.6 cm (65.98\")       PHYSICAL EXAM  Debilities/Disabilities Identified: None  Emotional Behavior: Appropriate  Wt Readings from Last 3 Encounters:   01/10/23 60.1 kg (132 lb 8 oz)   11/08/22 60 kg (132 lb 4.8 oz)   01/03/22 61.2 kg (135 lb)     Ht Readings from Last 1 Encounters:   01/10/23 167.6 cm (65.98\")     Body mass index is 21.4 kg/m².  Physical Exam  Constitutional:       Appearance: She is well-developed. She is not diaphoretic.   HENT:      Head: Normocephalic and atraumatic.   Eyes:      General: No scleral icterus.     Conjunctiva/sclera: Conjunctivae normal.      Pupils: Pupils are equal, round, and reactive to light.   Neck:      Thyroid: No thyromegaly.   Cardiovascular:      Rate and Rhythm: Normal rate and regular rhythm.      Heart sounds: Normal heart sounds. No murmur heard.    No gallop.   Pulmonary:       Effort: Pulmonary effort is normal.      Breath sounds: Normal breath sounds. No wheezing or rales.   Abdominal:      General: Bowel sounds are normal. There is no distension or abdominal bruit.      Palpations: Abdomen is soft. There is no shifting dullness, fluid wave or mass.      Tenderness: There is abdominal tenderness in the epigastric area. There is no guarding. Negative signs include Baca's sign.      Hernia: There is no hernia in the ventral area.      Comments: Gastritis like exam   Musculoskeletal:         General: Normal range of motion.      Cervical back: Normal range of motion and neck supple.   Lymphadenopathy:      Cervical: No cervical adenopathy.   Skin:     General: Skin is warm and dry.      Findings: No erythema or rash.   Neurological:      Mental Status: She is alert and oriented to person, place, and time.         CLINICAL DATA REVIEWED   reviewed previous lab results and integrated with today's visit, reviewed notes from other physicians and/or last GI encounter, reviewed previous endoscopy results and available photos, reviewed surgical pathology results from previous biopsies    ASSESSMENT  Diagnoses and all orders for this visit:    Dyspepsia    History of Helicobacter pylori infection    Personal history of colonic polyps    Other orders  -     vitamin D3 125 MCG (5000 UT) capsule capsule; Take 5,000 Units by mouth Daily.  -     polyethylene glycol (MiraLax) 17 GM/SCOOP powder; Take 17 g by mouth Daily As Needed. Average 3 times per week          PLAN  Will now go ahead and check for recurrent HP and will send daily Omeprazole for her GERD    Return in about 4 months (around 5/10/2023).    I have discussed the above plan with the patient.  They verbalize understanding and are in agreement with the plan.  They have been advised to contact the office for any questions, concerns, or changes related to their health.

## 2023-01-12 ENCOUNTER — LAB (OUTPATIENT)
Dept: LAB | Facility: HOSPITAL | Age: 58
End: 2023-01-12
Payer: COMMERCIAL

## 2023-01-12 PROCEDURE — 87338 HPYLORI STOOL AG IA: CPT | Performed by: INTERNAL MEDICINE

## 2023-01-14 LAB — H PYLORI AG STL QL IA: NEGATIVE

## 2023-02-01 ENCOUNTER — TRANSCRIBE ORDERS (OUTPATIENT)
Dept: MAMMOGRAPHY | Facility: HOSPITAL | Age: 58
End: 2023-02-01
Payer: COMMERCIAL

## 2023-02-01 DIAGNOSIS — Z12.31 VISIT FOR SCREENING MAMMOGRAM: Primary | ICD-10-CM

## 2023-02-16 ENCOUNTER — HOSPITAL ENCOUNTER (OUTPATIENT)
Dept: MAMMOGRAPHY | Facility: HOSPITAL | Age: 58
Discharge: HOME | End: 2023-02-16
Admitting: FAMILY MEDICINE
Payer: COMMERCIAL

## 2023-02-16 DIAGNOSIS — Z12.31 VISIT FOR SCREENING MAMMOGRAM: ICD-10-CM

## 2023-02-16 PROCEDURE — 77063 BREAST TOMOSYNTHESIS BI: CPT

## 2023-02-16 PROCEDURE — 77067 SCR MAMMO BI INCL CAD: CPT

## 2023-02-22 ENCOUNTER — TRANSCRIBE ORDERS (OUTPATIENT)
Dept: ADMINISTRATIVE | Facility: HOSPITAL | Age: 58
End: 2023-02-22
Payer: COMMERCIAL

## 2023-02-22 DIAGNOSIS — R92.8 ABNORMAL MAMMOGRAM: Primary | ICD-10-CM

## 2023-03-28 ENCOUNTER — HOSPITAL ENCOUNTER (OUTPATIENT)
Dept: MAMMOGRAPHY | Facility: HOSPITAL | Age: 58
Discharge: HOME | End: 2023-03-28
Payer: COMMERCIAL

## 2023-03-28 ENCOUNTER — HOSPITAL ENCOUNTER (OUTPATIENT)
Dept: ULTRASOUND IMAGING | Facility: HOSPITAL | Age: 58
Discharge: HOME | End: 2023-03-28
Payer: COMMERCIAL

## 2023-03-28 DIAGNOSIS — R92.8 ABNORMAL MAMMOGRAM: ICD-10-CM

## 2023-03-28 PROCEDURE — 77065 DX MAMMO INCL CAD UNI: CPT

## 2023-03-28 PROCEDURE — G0279 TOMOSYNTHESIS, MAMMO: HCPCS

## 2023-03-28 PROCEDURE — 76641 ULTRASOUND BREAST COMPLETE: CPT

## 2023-09-21 ENCOUNTER — HOSPITAL ENCOUNTER (OUTPATIENT)
Dept: CT IMAGING | Facility: HOSPITAL | Age: 58
Discharge: HOME | End: 2023-09-21
Admitting: FAMILY MEDICINE

## 2023-09-21 DIAGNOSIS — E78.5 HYPERLIPIDEMIA, UNSPECIFIED HYPERLIPIDEMIA TYPE: ICD-10-CM

## 2023-09-21 PROCEDURE — 75571 CT HRT W/O DYE W/CA TEST: CPT

## 2023-12-18 DIAGNOSIS — R10.13 DYSPEPSIA: Primary | ICD-10-CM

## 2023-12-18 RX ORDER — OMEPRAZOLE 40 MG/1
40 CAPSULE, DELAYED RELEASE ORAL DAILY
Qty: 90 CAPSULE | Refills: 0 | Status: SHIPPED | OUTPATIENT
Start: 2023-12-18

## 2024-02-22 ENCOUNTER — TELEPHONE (OUTPATIENT)
Dept: GASTROENTEROLOGY | Facility: CLINIC | Age: 59
End: 2024-02-22
Payer: COMMERCIAL

## 2024-02-22 NOTE — TELEPHONE ENCOUNTER
FAST TRACK - 3  YEAR RECALL  LAST COLONOSCOPY 04/23/2021  PERSONAL HISTORY POLYPS  FAMILY HISTORY POLYPS, FATHER   SCHEDULE AT [employer].

## 2024-02-23 DIAGNOSIS — Z86.010 PERSONAL HISTORY OF COLONIC POLYPS: Primary | ICD-10-CM

## 2024-03-18 DIAGNOSIS — R10.13 DYSPEPSIA: ICD-10-CM

## 2024-03-18 RX ORDER — OMEPRAZOLE 40 MG/1
40 CAPSULE, DELAYED RELEASE ORAL DAILY
Qty: 30 CAPSULE | Refills: 0 | Status: SHIPPED | OUTPATIENT
Start: 2024-03-18

## 2024-04-10 ENCOUNTER — TRANSCRIBE ORDERS (OUTPATIENT)
Dept: ADMINISTRATIVE | Facility: HOSPITAL | Age: 59
End: 2024-04-10
Payer: COMMERCIAL

## 2024-04-10 DIAGNOSIS — M81.0 POSTMENOPAUSAL BONE LOSS: ICD-10-CM

## 2024-04-10 DIAGNOSIS — Z12.31 VISIT FOR SCREENING MAMMOGRAM: Primary | ICD-10-CM

## 2024-05-03 ENCOUNTER — HOSPITAL ENCOUNTER (OUTPATIENT)
Dept: MAMMOGRAPHY | Facility: HOSPITAL | Age: 59
Discharge: HOME | End: 2024-05-03
Payer: COMMERCIAL

## 2024-05-03 ENCOUNTER — APPOINTMENT (OUTPATIENT)
Dept: BONE DENSITY | Facility: HOSPITAL | Age: 59
End: 2024-05-03
Payer: COMMERCIAL

## 2024-05-03 DIAGNOSIS — Z12.31 VISIT FOR SCREENING MAMMOGRAM: ICD-10-CM

## 2024-05-03 DIAGNOSIS — M81.0 POSTMENOPAUSAL BONE LOSS: ICD-10-CM

## 2024-05-03 PROCEDURE — 77067 SCR MAMMO BI INCL CAD: CPT

## 2024-05-03 PROCEDURE — 77080 DXA BONE DENSITY AXIAL: CPT

## 2024-05-03 PROCEDURE — 77063 BREAST TOMOSYNTHESIS BI: CPT

## 2024-05-14 ENCOUNTER — OFFICE VISIT (OUTPATIENT)
Dept: GASTROENTEROLOGY | Facility: CLINIC | Age: 59
End: 2024-05-14
Payer: COMMERCIAL

## 2024-05-14 VITALS
WEIGHT: 133.2 LBS | HEIGHT: 66 IN | DIASTOLIC BLOOD PRESSURE: 76 MMHG | SYSTOLIC BLOOD PRESSURE: 110 MMHG | BODY MASS INDEX: 21.41 KG/M2

## 2024-05-14 DIAGNOSIS — Z86.010 PERSONAL HISTORY OF COLONIC POLYPS: ICD-10-CM

## 2024-05-14 DIAGNOSIS — G43.809 OTHER MIGRAINE WITHOUT STATUS MIGRAINOSUS, NOT INTRACTABLE: Primary | ICD-10-CM

## 2024-05-14 DIAGNOSIS — R10.13 DYSPEPSIA: ICD-10-CM

## 2024-05-14 PROCEDURE — 99214 OFFICE O/P EST MOD 30 MIN: CPT

## 2024-05-14 RX ORDER — OMEPRAZOLE 40 MG/1
40 CAPSULE, DELAYED RELEASE ORAL DAILY
Qty: 90 CAPSULE | Refills: 3 | Status: SHIPPED | OUTPATIENT
Start: 2024-05-14

## 2024-05-14 NOTE — PROGRESS NOTES
PATIENT INFORMATION  Taras Tovar       - 1965    CHIEF COMPLAINT  Chief Complaint   Patient presents with    Dyspepsia        HISTORY OF PRESENT ILLNESS  Here today for evaluation    Is due for colonoscopy, was started on omeprazole at LOV, if eating tomato sauce or chips and awakens with RUQ. Wants to make sure everything is ok. Only breakthrough with triggers. NSAIDs pretty frequently, does not feel like it effects her.    Chronic migraines, intolerant to many meds, recommend neurology, reviewed headache clinic will not see her because PCP is in [place]. Will send referral as would be beneficial to limit NSAID use and for quality of life.    Bowels are moving ok, miralax QOD and keeps bowels moving, no straining or bleeding.    Reports needing abx with scopes per cards for valve issues.    2021 last colon with 3/3 adenomas. Father with polyps.         REVIEWED PERTINENT RESULTS/ LABS  Lab Results   Component Value Date    CASEREPORT  2021     Surgical Pathology Report                         Case: [id number]                                  Authorizing Provider:  Cr Morrison        Collected:           2021 09:16 AM                                 MD Clayton                                                                   Ordering Location:     [facility]   Received:            2021 10:58 AM                                 OR                                                                           Pathologist:           Shirley Cantor MD                                                    Specimens:   1) - Large Intestine, Cecum, cecal polyp                                                            2) - Large Intestine, Right / Ascending Colon, ascending colon polyp                                3) - Large Intestine, Transverse Colon, transverse colon polyp                             FINALDX  2021     1. Cecum, Biopsy:    A. Fragment of  tubular adenoma.   B. Negative for high-grade dysplasia.    2. Ascending Colon, Biopsy:   A. Tubular adenoma.   B. Negative for high-grade dysplasia.    3. Transverse Colon, Biopsy:   A. Tubular adenoma.   B. Negative for high-grade dysplasia.    swm/pkm        Lab Results   Component Value Date    HGB 12.8 12/24/2021    MCV 88.0 12/24/2021     12/24/2021    ALT 16 12/24/2021    AST 17 12/24/2021    INR 1.0 12/24/2021      Mammo Screening Digital Tomosynthesis Bilateral With CAD    Result Date: 5/7/2024  Narrative: CURRENT COMPLAINT-SYMPTOMS: The patient is seen for yearly screening mammogram of the breast.  FILMS COMPARED: Today's mammograms compared to prior mammograms dated 2/11/2020, 2/12/2021, 2/15/2022 and 2/16/2023.  BILATERAL DIGITAL SCREENING MAMMOGRAM WITH TOMOSYNTHESIS AND R2 CAD  The following views were obtained: Bilateral craniocaudal; bilateral mediolateral oblique. This examination was reviewed with the aid of R2 computer aided detection.  Breast Density: There are scattered areas of fibroglandular density.  There has been no significant interval change.  No suspicious masses, significant calcification or other abnormalities are seen.      Impression: Impression: There is no mammographic evidence of malignancy.  Screening mammogram in 1 year is recommended.  A notification was sent to the patient.  BI-RADS Category 1: Negative.   This report was finalized on 5/7/2024 2:18 PM by Bhupendra Theodore MD.      DEXA Bone Density Axial    Result Date: 5/3/2024  Narrative: DEXA BONE DENSITY AXIAL Date of Exam:  5/3/2024 1:51 PM EDT Indication:  POSTMENOPAUSAL BONE LOSS Comparison: 2/15/2022 Technique:  Lumbar vertebral and proximal femoral Dual-Energy X-ray Absorptiometry (DEXA) was performed on the [employer]. Findings: According to the World Health Organization criteria, this is classified as osteoporosis. The T-score at the femoral neck is -2.4. This represents a -13.2% change in bone  mineral density..  The T-score for the total spine is -3.3. This represents a -5.7% change in bone mineral density.. Using the FRAX scores, which utilized risk factors and femoral neck bone density: The 10 year probability of major osteoporotic fracture is 9.6%. The 10 year probability of a hip fracture is 1.6%.     Impression: Impression: Osteoporosis. Report dictated by: Ren Guajardo DNP  I have personally reviewed this case and agree with the findings above: Electronically Signed: Dheeraj Gutierrez MD  5/3/2024 2:38 PM EDT  Workstation ID: [id number]     REVIEW OF SYSTEMS  Review of Systems   Constitutional: Negative.    HENT: Negative.     Eyes: Negative.    Respiratory: Negative.     Cardiovascular: Negative.    Gastrointestinal:  Positive for constipation. Negative for abdominal pain, diarrhea, nausea and vomiting.        PHX polyps, Dyspepsia, HX h. Pylori    Endocrine: Negative.    Genitourinary: Negative.    Musculoskeletal:  Positive for back pain.   Skin: Negative.    Allergic/Immunologic: Positive for food allergies (Tree nuts, fish, shellfish).   Neurological: Negative.    Hematological: Negative.    Psychiatric/Behavioral: Negative.           ACTIVE PROBLEMS  Patient Active Problem List    Diagnosis     Personal history of colonic polyps [Z86.010]     Diverticulosis [K57.90]     Dyspepsia [R10.13]     History of Helicobacter pylori infection [Z86.19]     Encounter for screening for malignant neoplasm of colon [Z12.11]          PAST MEDICAL HISTORY  Past Medical History:   Diagnosis Date    Bell's palsy     Cancer 1992    Hysterectomy    Colon polyp 2020    Coronary artery disease     Valve disease    Fracture of wrist     Fracture, radius     Fracture, ulna     Irritable bowel syndrome 1980’s    PONV (postoperative nausea and vomiting)          SURGICAL HISTORY  Past Surgical History:   Procedure Laterality Date    BREAST BIOPSY Right     1990s    BREAST LUMPECTOMY Right     COLONOSCOPY  2020    COLONOSCOPY W/  "POLYPECTOMY N/A 04/23/2021    Procedure: COLONOSCOPY with polypectomy;  Surgeon: Cr Morrison MD;  Location: [facility];  Service: Gastroenterology;  Laterality: N/A;  cecal polyp  ascending colon polyp (cold snare)  transverse colon polyp  diverticulosis    HYSTERECTOMY      TUBAL ABDOMINAL LIGATION  1990         FAMILY HISTORY  Family History   Problem Relation Age of Onset    Breast cancer Maternal Aunt     Breast cancer Paternal Aunt          SOCIAL HISTORY  Social History     Occupational History    Not on file   Tobacco Use    Smoking status: Former     Current packs/day: 0.50     Types: Cigarettes    Smokeless tobacco: Never   Vaping Use    Vaping status: Never Used   Substance and Sexual Activity    Alcohol use: Never    Drug use: Never    Sexual activity: Defer         CURRENT MEDICATIONS    Current Outpatient Medications:     butalbital-acetaminophen-caffeine (FIORICET, ESGIC) -40 MG per tablet, TAKE ONE TABLET BY MOUTH TWICE DAILY AS NEEDED, Disp: 45 tablet, Rfl: 0    cyanocobalamin (VITAMIN B-12) 1000 MCG tablet, Take  by mouth., Disp: , Rfl:     escitalopram (LEXAPRO) 20 MG tablet, Take 1 tablet by mouth Daily., Disp: , Rfl:     fexofenadine (ALLEGRA) 180 MG tablet, Take 1 tablet by mouth Daily., Disp: , Rfl:     mometasone (NASONEX) 50 MCG/ACT nasal spray, mometasone 50 mcg/actuation nasal spray, Disp: , Rfl:     omeprazole (priLOSEC) 40 MG capsule, Take 1 capsule by mouth Daily., Disp: 90 capsule, Rfl: 3    polyethylene glycol (MiraLax) 17 GM/SCOOP powder, Take 17 g by mouth Daily As Needed. Average 3 times per week, Disp: , Rfl:     vitamin D3 125 MCG (5000 UT) capsule capsule, Take 1 capsule by mouth Daily., Disp: , Rfl:     ALLERGIES  Fish allergy and Penicillins    VITALS  Vitals:    05/14/24 1530   BP: 110/76   BP Location: Left arm   Patient Position: Sitting   Cuff Size: Adult   Weight: 60.4 kg (133 lb 3.2 oz)   Height: 167.6 cm (65.98\")       PHYSICAL " "EXAM  Debilities/Disabilities Identified: None  Emotional Behavior: Appropriate  Wt Readings from Last 3 Encounters:   05/14/24 60.4 kg (133 lb 3.2 oz)   01/10/23 60.1 kg (132 lb 8 oz)   11/08/22 60 kg (132 lb 4.8 oz)     Ht Readings from Last 1 Encounters:   05/14/24 167.6 cm (65.98\")     Body mass index is 21.51 kg/m².  Physical Exam  Constitutional:       General: She is not in acute distress.     Appearance: Normal appearance. She is not ill-appearing.   HENT:      Head: Normocephalic and atraumatic.      Mouth/Throat:      Mouth: Mucous membranes are moist.      Pharynx: No posterior oropharyngeal erythema.   Eyes:      General: No scleral icterus.  Cardiovascular:      Rate and Rhythm: Normal rate and regular rhythm.      Heart sounds: Normal heart sounds.   Pulmonary:      Effort: Pulmonary effort is normal.      Breath sounds: Normal breath sounds.   Abdominal:      General: Abdomen is flat. Bowel sounds are normal. There is no distension.      Palpations: Abdomen is soft. There is no mass.      Tenderness: There is no abdominal tenderness. There is no guarding or rebound. Negative signs include Baca's sign.      Hernia: No hernia is present.   Musculoskeletal:      Cervical back: Neck supple.   Skin:     General: Skin is warm.      Capillary Refill: Capillary refill takes less than 2 seconds.   Neurological:      General: No focal deficit present.      Mental Status: She is alert and oriented to person, place, and time.   Psychiatric:         Mood and Affect: Mood normal.         Behavior: Behavior normal.         Thought Content: Thought content normal.         Judgment: Judgment normal.         CLINICAL DATA REVIEWED   reviewed previous lab results and integrated with today's visit, reviewed notes from other physicians and/or last GI encounter, reviewed previous endoscopy results and available photos, reviewed surgical pathology results from previous biopsies    ASSESSMENT  Diagnoses and all orders for " this visit:    Other migraine without status migrainosus, not intractable  -     Ambulatory Referral to Neurology    Dyspepsia  -     omeprazole (priLOSEC) 40 MG capsule; Take 1 capsule by mouth Daily.  -     Case Request; Standing  -     Case Request    Personal history of colonic polyps  -     Case Request; Standing  -     Case Request    Other orders  -     Follow Anesthesia Guidelines / Protocol; Future  -     Obtain Informed Consent; Standing          PLAN    Continue daily PPI  Continue QOD miralax  [facility] for EGD  EGD and Colon    No follow-ups on file.    I have discussed the above plan with the patient.  They verbalize understanding and are in agreement with the plan.  They have been advised to contact the office for any questions, concerns, or changes related to their health.

## 2024-07-11 ENCOUNTER — OFFICE VISIT (OUTPATIENT)
Dept: INTERNAL MEDICINE | Facility: CLINIC | Age: 59
End: 2024-07-11
Payer: COMMERCIAL

## 2024-07-11 VITALS
WEIGHT: 127.4 LBS | DIASTOLIC BLOOD PRESSURE: 66 MMHG | OXYGEN SATURATION: 97 % | RESPIRATION RATE: 16 BRPM | HEIGHT: 66 IN | SYSTOLIC BLOOD PRESSURE: 114 MMHG | BODY MASS INDEX: 20.48 KG/M2 | HEART RATE: 60 BPM

## 2024-07-11 DIAGNOSIS — R68.2 DRY MOUTH AND EYES: ICD-10-CM

## 2024-07-11 DIAGNOSIS — E53.8 B12 DEFICIENCY: ICD-10-CM

## 2024-07-11 DIAGNOSIS — M81.0 OSTEOPOROSIS, UNSPECIFIED OSTEOPOROSIS TYPE, UNSPECIFIED PATHOLOGICAL FRACTURE PRESENCE: Primary | ICD-10-CM

## 2024-07-11 DIAGNOSIS — H04.123 DRY MOUTH AND EYES: ICD-10-CM

## 2024-07-11 PROBLEM — N95.2 ATROPHIC VAGINITIS: Status: ACTIVE | Noted: 2024-07-11

## 2024-07-11 PROBLEM — G43.909 MIGRAINE: Status: ACTIVE | Noted: 2024-07-11

## 2024-07-11 PROBLEM — M81.8 OTHER OSTEOPOROSIS WITHOUT CURRENT PATHOLOGICAL FRACTURE: Status: ACTIVE | Noted: 2024-07-11

## 2024-07-11 PROCEDURE — 99204 OFFICE O/P NEW MOD 45 MIN: CPT | Performed by: INTERNAL MEDICINE

## 2024-07-11 PROCEDURE — 96372 THER/PROPH/DIAG INJ SC/IM: CPT | Performed by: INTERNAL MEDICINE

## 2024-07-11 RX ORDER — CETIRIZINE HYDROCHLORIDE 10 MG/1
10 TABLET ORAL DAILY
COMMUNITY

## 2024-07-11 RX ORDER — FOLIC ACID 1 MG/1
1 TABLET ORAL DAILY
COMMUNITY

## 2024-07-11 RX ORDER — SODIUM CHLORIDE 9 MG/ML
20 INJECTION, SOLUTION INTRAVENOUS ONCE
OUTPATIENT
Start: 2024-07-15

## 2024-07-11 RX ORDER — ZOLEDRONIC ACID 5 MG/100ML
5 INJECTION, SOLUTION INTRAVENOUS ONCE
OUTPATIENT
Start: 2024-07-15

## 2024-07-11 RX ORDER — CYANOCOBALAMIN 1000 UG/ML
1000 INJECTION, SOLUTION INTRAMUSCULAR; SUBCUTANEOUS
Status: SHIPPED | OUTPATIENT
Start: 2024-07-11

## 2024-07-11 RX ADMIN — CYANOCOBALAMIN 1000 MCG: 1000 INJECTION, SOLUTION INTRAMUSCULAR; SUBCUTANEOUS at 15:31

## 2024-07-11 NOTE — PROGRESS NOTES
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 Roche, echo reviewed, she had a calcium scan with score of 0  Migraines - uses fioricet, previously tried multiple meds without much relief  4.  Dry mouth, eyes, etc, dentist recommended she get eval for sjrogen's     Labs done back in early July, reviewed in office, (to be scanned into chart)       Current Outpatient Medications:     butalbital-acetaminophen-caffeine (FIORICET, ESGIC) -40 MG per tablet, TAKE ONE TABLET BY MOUTH TWICE DAILY AS NEEDED, Disp: 45 tablet, Rfl: 0    cyanocobalamin (VITAMIN B-12) 1000 MCG tablet, Take  by mouth., Disp: , Rfl:     escitalopram (LEXAPRO) 20 MG tablet, Take 1 tablet by mouth Daily., Disp: , Rfl:     folic acid (FOLVITE) 1 MG tablet, Take 1 tablet by mouth Daily., Disp: , Rfl:     mometasone (NASONEX) 50 MCG/ACT nasal spray, mometasone 50 mcg/actuation nasal spray, Disp: , Rfl:     omeprazole (priLOSEC) 40 MG capsule, Take 1 capsule by mouth Daily., Disp: 90 capsule, Rfl: 3    polyethylene glycol (MiraLax) 17 GM/SCOOP powder, Take 17 g by mouth Daily As Needed. Average 3 times per week, Disp: , Rfl:     vitamin D3 125 MCG (5000 UT) capsule capsule, Take 1 capsule by mouth Daily., Disp: , Rfl:     cetirizine (zyrTEC) 10 MG tablet, Take 1 tablet by mouth Daily., Disp: , Rfl:     fexofenadine (ALLEGRA) 180 MG tablet, Take 1 tablet by mouth Daily., Disp: , Rfl:     Current Facility-Administered Medications:     cyanocobalamin injection 1,000  "mcg, 1,000 mcg, Intramuscular, Q28 Days, David Mejia MD, 1,000 mcg at 07/11/24 1531  Past Medical History:   Diagnosis Date    Bell's palsy     Cancer 1992    Hysterectomy    Colon polyp 2020    Coronary artery disease     Valve disease    Fracture of wrist     Fracture, radius     Fracture, ulna     Inflammatory bowel disease     Irritable bowel syndrome 1980’s    PONV (postoperative nausea and vomiting)      Past Surgical History:   Procedure Laterality Date    BREAST BIOPSY Right     1990s    BREAST LUMPECTOMY Right     COLONOSCOPY  2020    COLONOSCOPY W/ POLYPECTOMY N/A 04/23/2021    Procedure: COLONOSCOPY with polypectomy;  Surgeon: Cr Morrison MD;  Location: [facility];  Service: Gastroenterology;  Laterality: N/A;  cecal polyp  ascending colon polyp (cold snare)  transverse colon polyp  diverticulosis    HYSTERECTOMY      TUBAL ABDOMINAL LIGATION  1990     Family History   Problem Relation Age of Onset    Early death Father     Cancer Father     Cancer Maternal Aunt     Breast cancer Maternal Aunt     Breast cancer Paternal Aunt      Social History     Socioeconomic History    Marital status:    Tobacco Use    Smoking status: Former     Current packs/day: 0.50     Types: Cigarettes    Smokeless tobacco: Never   Vaping Use    Vaping status: Never Used   Substance and Sexual Activity    Alcohol use: Never    Drug use: Never    Sexual activity: Not Currently     Partners: Male        reviewed and updated    Objective   Vital Signs:  /66   Pulse 60   Resp 16   Ht 167.6 cm (66\")   Wt 57.8 kg (127 lb 6.4 oz)   SpO2 97%   BMI 20.56 kg/m²   Estimated body mass index is 20.56 kg/m² as calculated from the following:    Height as of this encounter: 167.6 cm (66\").    Weight as of this encounter: 57.8 kg (127 lb 6.4 oz).    BMI is within normal parameters. No other follow-up for BMI required.      Physical Exam  Vitals and nursing note reviewed.   Constitutional:       General: She is " not in acute distress.     Appearance: Normal appearance.   HENT:      Head: Normocephalic and atraumatic.      Right Ear: External ear normal.      Left Ear: External ear normal.      Nose: Nose normal. No congestion.      Mouth/Throat:      Mouth: Mucous membranes are moist.      Pharynx: No oropharyngeal exudate or posterior oropharyngeal erythema.   Eyes:      Extraocular Movements: Extraocular movements intact.      Conjunctiva/sclera: Conjunctivae normal.      Pupils: Pupils are equal, round, and reactive to light.   Cardiovascular:      Rate and Rhythm: Normal rate and regular rhythm.      Pulses: Normal pulses.      Heart sounds: Murmur heard.   Pulmonary:      Effort: Pulmonary effort is normal. No respiratory distress.      Breath sounds: Normal breath sounds. No wheezing or rales.   Musculoskeletal:      Cervical back: Normal range of motion.   Skin:     General: Skin is warm.      Capillary Refill: Capillary refill takes less than 2 seconds.   Neurological:      General: No focal deficit present.      Mental Status: She is alert and oriented to person, place, and time. Mental status is at baseline.      Cranial Nerves: No cranial nerve deficit.   Psychiatric:         Mood and Affect: Mood normal.         Behavior: Behavior normal.         Thought Content: Thought content normal.        Result Review :  The following data was reviewed by: David Mejia MD on 07/11/2024:    Data reviewed : prior office, consultant notes reviewed           Assessment and Plan   Diagnoses and all orders for this visit:    1. 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 B12 deficiency  -     cyanocobalamin injection 1,000 mcg  Dx in [place], not sure about instrinsic factor abs, would consider checking she would prefer to transition to SQ injection which we can do in office    3. Dry mouth and eyes  -     BHARGAVI by IFA, Reflex 9-biomarkers profile  Check sjrogen's abs and BHARGAVI, we discussed sometimes seen in other autoimmune processes as well       I spent 20 minutes caring for Taras on this date of service. This time includes time spent by me in the following activities:preparing for the visit, reviewing tests, obtaining and/or reviewing a separately obtained history, performing a medically appropriate examination and/or evaluation , counseling and educating the patient/family/caregiver, ordering medications, tests, or procedures, and documenting information in the medical record  Follow Up   F/u pending lab results  Patient was given instructions and counseling regarding her condition or for health maintenance advice. Please see specific information pulled into the AVS if appropriate.     David Mejia MD  [facility]

## 2024-07-16 LAB
ANA HOMOGEN TITR SER: ABNORMAL {TITER}
ANA SER QL IF: POSITIVE
CENTROMERE B AB SER-ACNC: <0.2 AI (ref 0–0.9)
CHROMATIN AB SERPL-ACNC: <0.2 AI (ref 0–0.9)
DSDNA AB SER-ACNC: <1 IU/ML (ref 0–9)
ENA JO1 AB SER-ACNC: <0.2 AI (ref 0–0.9)
ENA RNP AB SER-ACNC: <0.2 AI (ref 0–0.9)
ENA SCL70 AB SER-ACNC: <0.2 AI (ref 0–0.9)
ENA SM AB SER-ACNC: <0.2 AI (ref 0–0.9)
ENA SS-A AB SER-ACNC: <0.2 AI (ref 0–0.9)
ENA SS-B AB SER-ACNC: <0.2 AI (ref 0–0.9)
LABORATORY COMMENT REPORT: ABNORMAL
Lab: ABNORMAL
Lab: ABNORMAL

## 2024-07-19 ENCOUNTER — HOSPITAL ENCOUNTER (OUTPATIENT)
Dept: INFUSION THERAPY | Facility: HOSPITAL | Age: 59
Discharge: HOME | End: 2024-07-19
Payer: COMMERCIAL

## 2024-07-19 DIAGNOSIS — M81.8 OTHER OSTEOPOROSIS WITHOUT CURRENT PATHOLOGICAL FRACTURE: Primary | ICD-10-CM

## 2024-07-23 ENCOUNTER — TELEPHONE (OUTPATIENT)
Dept: INFUSION THERAPY | Facility: HOSPITAL | Age: 59
End: 2024-07-23
Payer: COMMERCIAL

## 2024-07-23 NOTE — TELEPHONE ENCOUNTER
Called patient to get her rescheduled for her Reclast. No answer, left message. Awaiting call back.

## 2024-07-30 ENCOUNTER — ANESTHESIA EVENT (OUTPATIENT)
Dept: PERIOP | Facility: HOSPITAL | Age: 59
End: 2024-07-30
Payer: COMMERCIAL

## 2024-07-31 ENCOUNTER — HOSPITAL ENCOUNTER (OUTPATIENT)
Facility: HOSPITAL | Age: 59
Setting detail: HOSPITAL OUTPATIENT SURGERY
Discharge: HOME | End: 2024-07-31
Attending: INTERNAL MEDICINE | Admitting: INTERNAL MEDICINE
Payer: COMMERCIAL

## 2024-07-31 ENCOUNTER — ANESTHESIA (OUTPATIENT)
Dept: PERIOP | Facility: HOSPITAL | Age: 59
End: 2024-07-31
Payer: COMMERCIAL

## 2024-07-31 VITALS
TEMPERATURE: 98.1 F | BODY MASS INDEX: 20.01 KG/M2 | DIASTOLIC BLOOD PRESSURE: 63 MMHG | HEART RATE: 53 BPM | SYSTOLIC BLOOD PRESSURE: 133 MMHG | WEIGHT: 124 LBS | OXYGEN SATURATION: 90 % | RESPIRATION RATE: 15 BRPM

## 2024-07-31 DIAGNOSIS — Z86.010 PERSONAL HISTORY OF COLONIC POLYPS: ICD-10-CM

## 2024-07-31 DIAGNOSIS — R10.13 DYSPEPSIA: ICD-10-CM

## 2024-07-31 PROCEDURE — 25810000003 LACTATED RINGERS PER 1000 ML: Performed by: NURSE ANESTHETIST, CERTIFIED REGISTERED

## 2024-07-31 PROCEDURE — 25010000002 PROPOFOL 200 MG/20ML EMULSION

## 2024-07-31 PROCEDURE — 43239 EGD BIOPSY SINGLE/MULTIPLE: CPT | Performed by: INTERNAL MEDICINE

## 2024-07-31 PROCEDURE — 25010000002 ONDANSETRON PER 1 MG

## 2024-07-31 PROCEDURE — 25010000002 CEFAZOLIN PER 500 MG: Performed by: INTERNAL MEDICINE

## 2024-07-31 PROCEDURE — 45380 COLONOSCOPY AND BIOPSY: CPT | Performed by: INTERNAL MEDICINE

## 2024-07-31 PROCEDURE — 88305 TISSUE EXAM BY PATHOLOGIST: CPT | Performed by: INTERNAL MEDICINE

## 2024-07-31 RX ORDER — LIDOCAINE HYDROCHLORIDE 20 MG/ML
INJECTION, SOLUTION INFILTRATION; PERINEURAL AS NEEDED
Status: DISCONTINUED | OUTPATIENT
Start: 2024-07-31 | End: 2024-07-31 | Stop reason: SURG

## 2024-07-31 RX ORDER — SODIUM CHLORIDE, SODIUM LACTATE, POTASSIUM CHLORIDE, CALCIUM CHLORIDE 600; 310; 30; 20 MG/100ML; MG/100ML; MG/100ML; MG/100ML
9 INJECTION, SOLUTION INTRAVENOUS CONTINUOUS PRN
Status: DISCONTINUED | OUTPATIENT
Start: 2024-07-31 | End: 2024-07-31 | Stop reason: HOSPADM

## 2024-07-31 RX ORDER — SUCRALFATE 1 G/1
1 TABLET ORAL 4 TIMES DAILY
Qty: 120 TABLET | Refills: 11 | Status: SHIPPED | OUTPATIENT
Start: 2024-07-31

## 2024-07-31 RX ORDER — SODIUM CHLORIDE 0.9 % (FLUSH) 0.9 %
10 SYRINGE (ML) INJECTION EVERY 12 HOURS SCHEDULED
Status: DISCONTINUED | OUTPATIENT
Start: 2024-07-31 | End: 2024-07-31 | Stop reason: HOSPADM

## 2024-07-31 RX ORDER — SODIUM CHLORIDE, SODIUM LACTATE, POTASSIUM CHLORIDE, CALCIUM CHLORIDE 600; 310; 30; 20 MG/100ML; MG/100ML; MG/100ML; MG/100ML
100 INJECTION, SOLUTION INTRAVENOUS ONCE
Status: DISCONTINUED | OUTPATIENT
Start: 2024-07-31 | End: 2024-07-31 | Stop reason: HOSPADM

## 2024-07-31 RX ORDER — SODIUM CHLORIDE 9 MG/ML
40 INJECTION, SOLUTION INTRAVENOUS AS NEEDED
Status: DISCONTINUED | OUTPATIENT
Start: 2024-07-31 | End: 2024-07-31 | Stop reason: HOSPADM

## 2024-07-31 RX ORDER — ONDANSETRON 2 MG/ML
4 INJECTION INTRAMUSCULAR; INTRAVENOUS ONCE AS NEEDED
Status: COMPLETED | OUTPATIENT
Start: 2024-07-31 | End: 2024-07-31

## 2024-07-31 RX ORDER — SODIUM CHLORIDE 0.9 % (FLUSH) 0.9 %
10 SYRINGE (ML) INJECTION AS NEEDED
Status: DISCONTINUED | OUTPATIENT
Start: 2024-07-31 | End: 2024-07-31 | Stop reason: HOSPADM

## 2024-07-31 RX ORDER — PROPOFOL 10 MG/ML
INJECTION, EMULSION INTRAVENOUS AS NEEDED
Status: DISCONTINUED | OUTPATIENT
Start: 2024-07-31 | End: 2024-07-31 | Stop reason: SURG

## 2024-07-31 RX ADMIN — ONDANSETRON 4 MG: 2 INJECTION INTRAMUSCULAR; INTRAVENOUS at 11:39

## 2024-07-31 RX ADMIN — SODIUM CHLORIDE, POTASSIUM CHLORIDE, SODIUM LACTATE AND CALCIUM CHLORIDE 9 ML/HR: 600; 310; 30; 20 INJECTION, SOLUTION INTRAVENOUS at 10:16

## 2024-07-31 RX ADMIN — PROPOFOL 500 MG: 10 INJECTION, EMULSION INTRAVENOUS at 10:40

## 2024-07-31 RX ADMIN — CEFAZOLIN 2000 MG: 2 INJECTION, POWDER, FOR SOLUTION INTRAVENOUS at 10:18

## 2024-07-31 RX ADMIN — LIDOCAINE HYDROCHLORIDE 60 MG: 20 INJECTION, SOLUTION INFILTRATION; PERINEURAL at 10:40

## 2024-07-31 NOTE — ANESTHESIA PREPROCEDURE EVALUATION
Anesthesia Evaluation     Patient summary reviewed and Nursing notes reviewed   history of anesthetic complications:  PONV  NPO Solid Status: > 8 hours  NPO Liquid Status: > 8 hours           Airway   Mallampati: II  TM distance: <3 FB  Neck ROM: full  No difficulty expected  Dental - normal exam     Pulmonary - normal exam   (+) a smoker Former,  Cardiovascular - normal exam  Exercise tolerance: good (4-7 METS)    (+) valvular problems/murmurs murmur, MR and AI  (-) CAD      Neuro/Psych  (+) headaches  (-) numbness  GI/Hepatic/Renal/Endo    (+) GERD poorly controlled    Musculoskeletal     Abdominal  - normal exam   Substance History - negative use     OB/GYN negative ob/gyn ROS         Other   arthritis (OA hips, back, knees),   history of cancer remission                      Anesthesia Plan    ASA 2     MAC     intravenous induction     Anesthetic plan, risks, benefits, and alternatives have been provided, discussed and informed consent has been obtained with: patient.  Pre-procedure education provided  Use of blood products discussed with patient  Consented to blood products.    Plan discussed with CRNA.

## 2024-07-31 NOTE — BRIEF OP NOTE
ESOPHAGOGASTRODUODENOSCOPY WITH BIOPSY, COLONOSCOPY WITH POLYPECTOMY  Progress Note    Taras Tovar  7/31/2024    Pre-op Diagnosis:   Dyspepsia [R10.13]  Personal history of colonic polyps [Z86.010]       Post-Op Diagnosis Codes:     * Dyspepsia [R10.13]     * Personal history of colonic polyps [Z86.010]     * Gastritis [K29.70]     * Reflux esophagitis [K21.00]     * Diverticulosis [K57.90]     * Colon polyp [K63.5]    Procedure/CPT® Codes:        Procedure(s):  ESOPHAGOGASTRODUODENOSCOPY WITH BIOPSY  COLONOSCOPY WITH POLYPECTOMY              Surgeon(s):  Cr Morrison MD    Anesthesia: Monitored Anesthesia Care    Staff:   Circulator: Siomne Christiansen RN  Scrub Person: Maricruz Jon         Estimated Blood Loss: none    Urine Voided: * No values recorded between 7/31/2024 10:34 AM and 7/31/2024 11:09 AM *    Specimens:                Specimens       ID Source Type Tests Collected By Collected At Frozen?    A Gastric, Body Tissue TISSUE PATHOLOGY EXAM   Cr Morrison MD 7/31/24 1048 No    Description: Gastric Biopsy    B Esophagus, Distal Tissue TISSUE PATHOLOGY EXAM   Cr Morrison MD 7/31/24 1048 No    Description: Distal Esophageal Biopsy    C Large Intestine, Sigmoid Colon Polyp TISSUE PATHOLOGY EXAM   Cr Morrison MD 7/31/24 1105 No    Description: Sigmoid Polyp x1 (forcep)                  Drains: * No LDAs found *    Findings: Normal Duodenum  Mild Gastritis/Bile Reflux-Biopsy  Reflux Esophagitis-Biopsy    Colon to Ti good prep  Pan-Colonic Diverticulosis  Polyp-Biopsy        Complications: none          Cr Morrison MD     Date: 7/31/2024  Time: 11:13 EDT

## 2024-07-31 NOTE — H&P
Patient Care Team:  David Mejia MD as PCP - General (Internal Medicine)  Cr Morrison MD as Consulting Physician (Gastroenterology)    CHIEF COMPLAINT: Personal hx colon polyps and RUQ Pain    HISTORY OF PRESENT ILLNESS:  Last Colon was 2021    Past Medical History:   Diagnosis Date    Aortic valve defect     leaking    Bell's palsy     Cancer 1992    Hysterectomy uterus  cancer    Colon polyp 2020    Coronary artery disease     Valve disease   dental prophylaxis    Fracture of wrist     Fracture, radius     R  arm    Fracture, ulna     Inflammatory bowel disease     Irritable bowel syndrome 1980’s    Mitral valve disorder     PONV (postoperative nausea and vomiting)      Past Surgical History:   Procedure Laterality Date    BREAST BIOPSY Right     1990s    BREAST LUMPECTOMY Right     COLONOSCOPY  2020    COLONOSCOPY W/ POLYPECTOMY N/A 04/23/2021    Procedure: COLONOSCOPY with polypectomy;  Surgeon: Cr Morrison MD;  Location: [facility] OR;  Service: Gastroenterology;  Laterality: N/A;  cecal polyp  ascending colon polyp (cold snare)  transverse colon polyp  diverticulosis    HYSTERECTOMY      TUBAL ABDOMINAL LIGATION  1990     Family History   Problem Relation Age of Onset    Early death Father     Cancer Father     Cancer Maternal Aunt     Breast cancer Maternal Aunt     Breast cancer Paternal Aunt      Social History     Tobacco Use    Smoking status: Former     Current packs/day: 0.50     Types: Cigarettes    Smokeless tobacco: Never   Vaping Use    Vaping status: Never Used   Substance Use Topics    Alcohol use: Never    Drug use: Never     Facility-Administered Medications Prior to Admission   Medication Dose Route Frequency Provider Last Rate Last Admin    cyanocobalamin injection 1,000 mcg  1,000 mcg Intramuscular Q28 Days David Mejia MD   1,000 mcg at 07/11/24 1531     Medications Prior to Admission   Medication Sig Dispense Refill Last Dose    butalbital-acetaminophen-caffeine  (FIORICET, ESGIC) -40 MG per tablet TAKE ONE TABLET BY MOUTH TWICE DAILY AS NEEDED 45 tablet 0 7/30/2024    cetirizine (zyrTEC) 10 MG tablet Take 1 tablet by mouth Daily.   7/29/2024    escitalopram (LEXAPRO) 20 MG tablet Take 1 tablet by mouth Daily.   7/30/2024    folic acid (FOLVITE) 1 MG tablet Take 1 tablet by mouth Daily.   7/29/2024    mometasone (NASONEX) 50 MCG/ACT nasal spray mometasone 50 mcg/actuation nasal spray   Past Month    omeprazole (priLOSEC) 40 MG capsule Take 1 capsule by mouth Daily. 90 capsule 3 7/30/2024    polyethylene glycol (MiraLax) 17 GM/SCOOP powder Take 17 g by mouth Daily As Needed. Average 3 times per week   7/30/2024    vitamin D3 125 MCG (5000 UT) capsule capsule Take 1 capsule by mouth Daily.   7/29/2024    fexofenadine (ALLEGRA) 180 MG tablet Take 1 tablet by mouth Daily.   More than a month     Allergies:  Fish allergy and Penicillins    REVIEW OF SYSTEMS:  Please see the above history of present illness for pertinent positives and negatives.  The remainder of the patient's systems have been reviewed and are negative.     Vital Signs  Temp:  [98.1 °F (36.7 °C)] 98.1 °F (36.7 °C)  Heart Rate:  [63] 63  Resp:  [15] 15  BP: (113)/(69) 113/69    Flowsheet Rows      Flowsheet Row First Filed Value   Admission Height --   Admission Weight 56.2 kg (124 lb) Documented at 07/31/2024 1005             Physical Exam:  Physical Exam   Constitutional: Patient appears well-developed and well-nourished and in no acute distress   HEENT:   Head: Normocephalic and atraumatic.   Eyes:  Pupils are equal, round, and reactive to light. EOM are intact. Sclerae are anicteric and non-injected.  Mouth and Throat: Patient has moist mucous membranes. Oropharynx is clear of any erythema or exudate.     Neck: Neck supple. No JVD present. No thyromegaly present. No lymphadenopathy present.  Cardiovascular: Regular rate, regular rhythm, S1 normal and S2 normal.  Exam reveals no gallop and no friction  rub.  No murmur heard.  Pulmonary/Chest: Lungs are clear to auscultation bilaterally. No respiratory distress. No wheezes. No rhonchi. No rales.   Abdominal: Soft. Bowel sounds are normal. No distension and no mass. There is no hepatosplenomegaly. There is no tenderness.   Musculoskeletal: Normal Muscle tone  Extremities: No edema. Pulses are palpable in all 4 extremities.  Neurological: Patient is alert and oriented to person, place, and time. Cranial nerves II-XII are grossly intact with no focal deficits.  Skin: Skin is warm. No rash noted. Nails show no clubbing.  No cyanosis or erythema.    Debilities/Disabilities Identified: None  Emotional Behavior: Appropriate     Results Review:   I reviewed the patient's new clinical results.    Lab Results (most recent)       None            Imaging Results (Most Recent)       None          reviewed    ECG/EMG Results (most recent)       None          reviewed    Assessment & Plan   Personal hx colon polyps and RuQ Abd Pain /  EGD and colonoscopy      I discussed the patient's findings and my recommendations with patient.     Cr Morrison MD  07/31/24  10:25 EDT    Time: 10 min prior to procedure.

## 2024-07-31 NOTE — ANESTHESIA POSTPROCEDURE EVALUATION
Patient: Taras Tovar    Procedure Summary       Date: 07/31/24 Room / Location: [facility] ENDOSCOPY 1 /  [facility]    Anesthesia Start: 1033 Anesthesia Stop: 1113    Procedures:       ESOPHAGOGASTRODUODENOSCOPY WITH BIOPSY (Esophagus)      COLONOSCOPY WITH POLYPECTOMY Diagnosis:       Dyspepsia      Personal history of colonic polyps      Gastritis      Reflux esophagitis      Diverticulosis      Colon polyp      (Dyspepsia [R10.13])      (Personal history of colonic polyps [Z86.010])    Surgeons: Cr Morrison MD Provider: Carmen Arnold CRNA    Anesthesia Type: MAC ASA Status: 2            Anesthesia Type: MAC    Vitals  Vitals Value Taken Time   /63 07/31/24 1155   Temp     Pulse 47 07/31/24 1157   Resp 15 07/31/24 1155   SpO2 100 % 07/31/24 1158   Vitals shown include unfiled device data.        Post Anesthesia Care and Evaluation    Patient location during evaluation: PHASE II  Patient participation: complete - patient participated  Level of consciousness: awake and alert  Pain score: 0  Pain management: satisfactory to patient    Airway patency: patent  Anesthetic complications: No anesthetic complications  PONV Status: controlled  Cardiovascular status: acceptable  Respiratory status: acceptable  Hydration status: acceptable

## 2024-08-01 DIAGNOSIS — R10.13 EPIGASTRIC PAIN: Primary | ICD-10-CM

## 2024-08-01 LAB
LAB AP CASE REPORT: NORMAL
PATH REPORT.FINAL DX SPEC: NORMAL
PATH REPORT.GROSS SPEC: NORMAL

## 2024-08-02 ENCOUNTER — PATIENT MESSAGE (OUTPATIENT)
Dept: GASTROENTEROLOGY | Facility: CLINIC | Age: 59
End: 2024-08-02

## 2024-08-06 DIAGNOSIS — R10.13 DYSPEPSIA: Primary | ICD-10-CM

## 2024-08-06 RX ORDER — SUCRALFATE 1 G/1
1 TABLET ORAL 4 TIMES DAILY
Qty: 120 TABLET | Refills: 11 | Status: SHIPPED | OUTPATIENT
Start: 2024-08-06

## 2024-08-12 ENCOUNTER — HOSPITAL ENCOUNTER (OUTPATIENT)
Dept: INFUSION THERAPY | Facility: HOSPITAL | Age: 59
Discharge: HOME | End: 2024-08-12
Admitting: INTERNAL MEDICINE
Payer: COMMERCIAL

## 2024-08-12 VITALS
RESPIRATION RATE: 15 BRPM | HEART RATE: 46 BPM | DIASTOLIC BLOOD PRESSURE: 54 MMHG | TEMPERATURE: 96.8 F | OXYGEN SATURATION: 100 % | SYSTOLIC BLOOD PRESSURE: 128 MMHG

## 2024-08-12 DIAGNOSIS — M81.8 OTHER OSTEOPOROSIS WITHOUT CURRENT PATHOLOGICAL FRACTURE: Primary | ICD-10-CM

## 2024-08-12 LAB
ANION GAP SERPL CALCULATED.3IONS-SCNC: 8.5 MMOL/L (ref 5–15)
BUN SERPL-MCNC: 11 MG/DL (ref 6–20)
BUN/CREAT SERPL: 16.9 (ref 7–25)
CALCIUM SPEC-SCNC: 9.4 MG/DL (ref 8.6–10.5)
CHLORIDE SERPL-SCNC: 105 MMOL/L (ref 98–107)
CO2 SERPL-SCNC: 28.5 MMOL/L (ref 22–29)
CREAT SERPL-MCNC: 0.65 MG/DL (ref 0.57–1)
EGFRCR SERPLBLD CKD-EPI 2021: 101.6 ML/MIN/1.73
GLUCOSE SERPL-MCNC: 104 MG/DL (ref 65–99)
MAGNESIUM SERPL-MCNC: 2.1 MG/DL (ref 1.6–2.6)
PHOSPHATE SERPL-MCNC: 3.7 MG/DL (ref 2.5–4.5)
POTASSIUM SERPL-SCNC: 4.1 MMOL/L (ref 3.5–5.2)
SODIUM SERPL-SCNC: 142 MMOL/L (ref 136–145)

## 2024-08-12 PROCEDURE — 83735 ASSAY OF MAGNESIUM: CPT | Performed by: INTERNAL MEDICINE

## 2024-08-12 PROCEDURE — 96374 THER/PROPH/DIAG INJ IV PUSH: CPT

## 2024-08-12 PROCEDURE — 96365 THER/PROPH/DIAG IV INF INIT: CPT

## 2024-08-12 PROCEDURE — 25010000002 ZOLEDRONIC ACID 5 MG/100ML SOLUTION: Performed by: INTERNAL MEDICINE

## 2024-08-12 PROCEDURE — 84100 ASSAY OF PHOSPHORUS: CPT | Performed by: INTERNAL MEDICINE

## 2024-08-12 PROCEDURE — 36415 COLL VENOUS BLD VENIPUNCTURE: CPT

## 2024-08-12 PROCEDURE — 80048 BASIC METABOLIC PNL TOTAL CA: CPT | Performed by: INTERNAL MEDICINE

## 2024-08-12 RX ORDER — SODIUM CHLORIDE 9 MG/ML
20 INJECTION, SOLUTION INTRAVENOUS ONCE
OUTPATIENT
Start: 2024-08-12

## 2024-08-12 RX ORDER — ZOLEDRONIC ACID 5 MG/100ML
5 INJECTION, SOLUTION INTRAVENOUS ONCE
Status: COMPLETED | OUTPATIENT
Start: 2024-08-12 | End: 2024-08-12

## 2024-08-12 RX ORDER — ZOLEDRONIC ACID 5 MG/100ML
5 INJECTION, SOLUTION INTRAVENOUS ONCE
Status: CANCELLED | OUTPATIENT
Start: 2024-08-12

## 2024-08-12 RX ADMIN — ZOLEDRONIC ACID 5 MG: 5 INJECTION, SOLUTION INTRAVENOUS at 11:17

## 2024-08-12 NOTE — PATIENT INSTRUCTIONS
"  Call  Dr David Mejia [phone]  if you have any problems or concerns.    We know you have a Choice in healthcare and appreciate you using [facility].  Our purpose is to provide you \"Excellent Care\".  We hope that you will always choose us in the future and continue to recommend us to your family and friends.             "

## 2024-08-12 NOTE — NURSING NOTE
PT ARRIVED TO [facility] FOR APPT. VSS, NO COMPLAINTS AT THIS TIME. LABS DRAWN VIA VENIPUNCTURE. MEDICATION ADMINISTERED PER MD ORDER. PT TOLERATED WELL. VSS POST INFUSION. AVS PRINTED OUT, COPY GIVEN TO PT. PT DISCHARGED FROM [facility] AT 12:04 PM IN STABLE CONDITION, WITHOUT COMPLAINTS.

## 2024-09-05 ENCOUNTER — CLINICAL SUPPORT (OUTPATIENT)
Dept: INTERNAL MEDICINE | Facility: CLINIC | Age: 59
End: 2024-09-05
Payer: COMMERCIAL

## 2024-09-05 DIAGNOSIS — Z23 NEED FOR SHINGLES VACCINE: ICD-10-CM

## 2024-09-05 DIAGNOSIS — E53.8 VITAMIN B 12 DEFICIENCY: Primary | ICD-10-CM

## 2024-09-05 PROCEDURE — 96372 THER/PROPH/DIAG INJ SC/IM: CPT | Performed by: INTERNAL MEDICINE

## 2024-09-05 PROCEDURE — 90471 IMMUNIZATION ADMIN: CPT | Performed by: INTERNAL MEDICINE

## 2024-09-05 PROCEDURE — 90750 HZV VACC RECOMBINANT IM: CPT | Performed by: INTERNAL MEDICINE

## 2024-09-05 RX ADMIN — CYANOCOBALAMIN 1000 MCG: 1000 INJECTION, SOLUTION INTRAMUSCULAR; SUBCUTANEOUS at 10:33

## 2024-10-04 ENCOUNTER — CLINICAL SUPPORT (OUTPATIENT)
Dept: INTERNAL MEDICINE | Facility: CLINIC | Age: 59
End: 2024-10-04
Payer: COMMERCIAL

## 2024-10-04 DIAGNOSIS — E53.8 VITAMIN B12 DEFICIENCY: Primary | ICD-10-CM

## 2024-10-04 PROCEDURE — 96372 THER/PROPH/DIAG INJ SC/IM: CPT | Performed by: INTERNAL MEDICINE

## 2024-10-04 RX ADMIN — CYANOCOBALAMIN 1000 MCG: 1000 INJECTION, SOLUTION INTRAMUSCULAR; SUBCUTANEOUS at 10:59

## 2024-11-20 ENCOUNTER — OFFICE VISIT (OUTPATIENT)
Dept: INTERNAL MEDICINE | Facility: CLINIC | Age: 59
End: 2024-11-20
Payer: COMMERCIAL

## 2024-11-20 VITALS
OXYGEN SATURATION: 97 % | WEIGHT: 126 LBS | HEIGHT: 66 IN | BODY MASS INDEX: 20.25 KG/M2 | DIASTOLIC BLOOD PRESSURE: 84 MMHG | HEART RATE: 79 BPM | SYSTOLIC BLOOD PRESSURE: 122 MMHG

## 2024-11-20 DIAGNOSIS — R30.0 DYSURIA: ICD-10-CM

## 2024-11-20 DIAGNOSIS — N30.01 ACUTE CYSTITIS WITH HEMATURIA: Primary | ICD-10-CM

## 2024-11-20 LAB
BILIRUB BLD-MCNC: ABNORMAL MG/DL
CLARITY, POC: ABNORMAL
COLOR UR: ABNORMAL
EXPIRATION DATE: ABNORMAL
GLUCOSE UR STRIP-MCNC: NEGATIVE MG/DL
KETONES UR QL: ABNORMAL
LEUKOCYTE EST, POC: ABNORMAL
Lab: ABNORMAL
NITRITE UR-MCNC: POSITIVE MG/ML
PH UR: 5 [PH] (ref 5–8)
PROT UR STRIP-MCNC: ABNORMAL MG/DL
RBC # UR STRIP: ABNORMAL /UL
SP GR UR: 1.02 (ref 1–1.03)
UROBILINOGEN UR QL: ABNORMAL

## 2024-11-20 PROCEDURE — 99214 OFFICE O/P EST MOD 30 MIN: CPT

## 2024-11-20 PROCEDURE — 81003 URINALYSIS AUTO W/O SCOPE: CPT

## 2024-11-20 RX ORDER — NITROFURANTOIN 25; 75 MG/1; MG/1
100 CAPSULE ORAL 2 TIMES DAILY
Qty: 10 CAPSULE | Refills: 0 | Status: SHIPPED | OUTPATIENT
Start: 2024-11-20 | End: 2024-11-25

## 2024-11-20 NOTE — PROGRESS NOTES
"Chief Complaint  Follow-up (Burning sensation)    Subjective        Taras Tovar presents to [facility]  History of Present Illness    Taras presents today with complaints of burning with urination  It started five days ago  Feels pressure in her pelvis  Burning sensation 24/7, not just when urinating  Increased frequency of urinating, every hour - also drinking more fluids to flush kidneys - hx of kidney stones 5-6 years ago  Recently on amoxicillin for tooth infection - finished course yesterday - still has tooth infection but will have tooth extraction and will start clindamycin in December  Denies flank pain, nausea, vomiting, fever  Took OTC azos yesterday at 10 am and on Monday - provided some relief      Objective   Vital Signs:  /84 (BP Location: Left arm, Patient Position: Sitting, Cuff Size: Adult)   Pulse 79   Ht 167.6 cm (66\")   Wt 57.2 kg (126 lb)   SpO2 97%   BMI 20.34 kg/m²   Estimated body mass index is 20.34 kg/m² as calculated from the following:    Height as of this encounter: 167.6 cm (66\").    Weight as of this encounter: 57.2 kg (126 lb).    BMI is within normal parameters. No other follow-up for BMI required.      Physical Exam  Vitals reviewed.   Constitutional:       Appearance: Normal appearance. She is not ill-appearing or toxic-appearing.   Cardiovascular:      Rate and Rhythm: Normal rate and regular rhythm.      Heart sounds: Normal heart sounds.   Pulmonary:      Effort: Pulmonary effort is normal.      Breath sounds: Normal breath sounds.   Abdominal:      General: Abdomen is flat. Bowel sounds are normal.      Palpations: Abdomen is soft.      Tenderness: There is no abdominal tenderness. There is no right CVA tenderness, left CVA tenderness or guarding.   Skin:     General: Skin is warm.   Neurological:      Mental Status: She is alert.      Motor: No weakness.      Gait: Gait normal.   Psychiatric:         Mood and " Affect: Mood normal.         Behavior: Behavior normal.         Thought Content: Thought content normal.         Judgment: Judgment normal.        Result Review :  The following data was reviewed by: ROSI Quevedo on 11/20/2024:  UA          11/20/2024    10:51   Urinalysis   Ketones, UA 1+    Leukocytes,  Minerva/ul      Brief Urine Lab Results  (Last result in the past 365 days)        Color   Clarity   Blood   Leuk Est   Nitrite   Protein   CREAT   Urine HCG        11/20/24 1051 Dark Yellow   Cloudy   50 Homer/ul   500 Minerva/ul   Positive   Trace                            Assessment and Plan   Diagnoses and all orders for this visit:    1. Acute cystitis with hematuria (Primary)  -     nitrofurantoin, macrocrystal-monohydrate, (Macrobid) 100 MG capsule; Take 1 capsule by mouth 2 (Two) Times a Day for 5 days.  Dispense: 10 capsule; Refill: 0    2. Dysuria  -     POC Urinalysis Dipstick, Automated  -     Urine Culture - Urine, Urine, Clean Catch    Will send urine culture and notify patient of results. Hoping to get results Friday before going into the weekend. Patient to call Friday if she is not feeling some improvement after starting antibiotics today.         Follow Up   Return if symptoms worsen or fail to improve.  Patient was given instructions and counseling regarding her condition or for health maintenance advice. Please see specific information pulled into the AVS if appropriate.

## 2024-11-23 LAB
BACTERIA UR CULT: ABNORMAL
BACTERIA UR CULT: ABNORMAL
OTHER ANTIBIOTIC SUSC ISLT: ABNORMAL

## 2024-11-25 ENCOUNTER — TELEPHONE (OUTPATIENT)
Dept: INTERNAL MEDICINE | Facility: CLINIC | Age: 59
End: 2024-11-25

## 2024-11-25 DIAGNOSIS — N30.01 ACUTE CYSTITIS WITH HEMATURIA: Primary | ICD-10-CM

## 2024-11-25 RX ORDER — SULFAMETHOXAZOLE AND TRIMETHOPRIM 800; 160 MG/1; MG/1
1 TABLET ORAL 2 TIMES DAILY
Qty: 6 TABLET | Refills: 0 | Status: SHIPPED | OUTPATIENT
Start: 2024-11-25 | End: 2024-11-28

## 2024-11-25 NOTE — TELEPHONE ENCOUNTER
Caller: Taras Tovar     Best call back number: [phone]*    What is your medical concern? STILL HAVING URINARY TRACT INFECTION SYMPTOMS AFTER FINISHING ANTIBIOTIC. SYMPTOMS OF PRESSURE AND BURNING WHEN URINATING. PATIENT STATES THAT THE SYMPTOMS ARE NOT AS BAD, BUT STILL PRESENT.    Is your provider already aware of this issue? YES, ROSI RICHARDSON.    Have you been treated for this issue? YES    PATIENT REQUEST FOR AFSANEH BURT TO REVIEW THE URINARY LAB RESULTS AND CALL HER BACK TO ADVISE.

## 2024-12-17 ENCOUNTER — CLINICAL SUPPORT (OUTPATIENT)
Dept: INTERNAL MEDICINE | Facility: CLINIC | Age: 59
End: 2024-12-17
Payer: COMMERCIAL

## 2024-12-17 DIAGNOSIS — G43.809 OTHER MIGRAINE WITHOUT STATUS MIGRAINOSUS, NOT INTRACTABLE: Primary | ICD-10-CM

## 2024-12-17 DIAGNOSIS — E53.8 B12 DEFICIENCY: ICD-10-CM

## 2024-12-17 DIAGNOSIS — Z23 NEED FOR SHINGLES VACCINE: Primary | ICD-10-CM

## 2024-12-17 PROCEDURE — 96372 THER/PROPH/DIAG INJ SC/IM: CPT | Performed by: INTERNAL MEDICINE

## 2024-12-17 PROCEDURE — 90471 IMMUNIZATION ADMIN: CPT | Performed by: INTERNAL MEDICINE

## 2024-12-17 PROCEDURE — 90750 HZV VACC RECOMBINANT IM: CPT | Performed by: INTERNAL MEDICINE

## 2024-12-17 RX ADMIN — CYANOCOBALAMIN 1000 MCG: 1000 INJECTION, SOLUTION INTRAMUSCULAR; SUBCUTANEOUS at 11:09

## 2024-12-17 NOTE — TELEPHONE ENCOUNTER
I spoke with manasa and she stated she is needing a refill on the Butalbital-Acetaminophen- Caffe. Are you able to send this in for her?

## 2024-12-17 NOTE — TELEPHONE ENCOUNTER
Rx Refill Note  Requested Prescriptions     Pending Prescriptions Disp Refills    butalbital-acetaminophen-caffeine (FIORICET, ESGIC) -40 MG per tablet 45 tablet 0     Sig: Take 1 tablet by mouth 2 (Two) Times a Day As Needed.      Last office visit with prescribing clinician: 7/11/2024   Last telemedicine visit with prescribing clinician: Visit date not found   Next office visit with prescribing clinician: 1/13/2025                         Would you like a call back once the refill request has been completed: [] Yes [] No    If the office needs to give you a call back, can they leave a voicemail: [] Yes [] No    Little Woodruff MA  12/17/24, 10:51 EST

## 2024-12-18 DIAGNOSIS — G43.809 OTHER MIGRAINE WITHOUT STATUS MIGRAINOSUS, NOT INTRACTABLE: ICD-10-CM

## 2024-12-18 RX ORDER — BUTALBITAL, ACETAMINOPHEN AND CAFFEINE 50; 325; 40 MG/1; MG/1; MG/1
1 TABLET ORAL 2 TIMES DAILY PRN
Qty: 45 TABLET | Refills: 0 | Status: SHIPPED | OUTPATIENT
Start: 2024-12-18 | End: 2024-12-18 | Stop reason: SDUPTHER

## 2024-12-18 RX ORDER — BUTALBITAL, ACETAMINOPHEN AND CAFFEINE 50; 325; 40 MG/1; MG/1; MG/1
1 TABLET ORAL 2 TIMES DAILY PRN
Qty: 270 TABLET | Refills: 0 | Status: SHIPPED | OUTPATIENT
Start: 2024-12-18

## 2025-01-02 DIAGNOSIS — G43.809 OTHER MIGRAINE WITHOUT STATUS MIGRAINOSUS, NOT INTRACTABLE: ICD-10-CM

## 2025-01-02 RX ORDER — BUTALBITAL, ACETAMINOPHEN AND CAFFEINE 50; 325; 40 MG/1; MG/1; MG/1
1 TABLET ORAL 2 TIMES DAILY PRN
Qty: 28 TABLET | Refills: 0 | Status: SHIPPED | OUTPATIENT
Start: 2025-01-02

## 2025-01-02 NOTE — TELEPHONE ENCOUNTER
Caller: Taras Tovar    Relationship: Self    Best call back number: [phone]    Requested Prescriptions:   Requested Prescriptions     Pending Prescriptions Disp Refills    butalbital-acetaminophen-caffeine (FIORICET, ESGIC) -40 MG per tablet 270 tablet 0     Sig: Take 1 tablet by mouth 2 (Two) Times a Day As Needed for Headache.      [address] [phone] [facility] [phone] FX     Last office visit with prescribing clinician: 7/11/2024   Last telemedicine visit with prescribing clinician: Visit date not found   Next office visit with prescribing clinician: 1/13/2025     Additional details provided by patient: PATIENT NEEDS 14 DAYS OF THIS CALLED IN. PATIENT STATES THAT [facility] MADE A NOTE FOR THIS PRESCRIPTION FOR LOCAL PHARMACY TO FILL THIS FOR 14 DAYS, DUE TO POST OFFICE LOSING PATIENT'S MEDICATION. PATIENT HAS PAPERWORK FOR THIS IF NEEDED, PLEASE ADVISE PATIENT IF THIS SUPPLY CAN BE SENT.     Does the patient have less than a 3 day supply:  [x] Yes  [] No      Lora Messer Rep   01/02/25 09:40 EST       
See message, please.    Thank you  
Statement Selected

## 2025-02-14 ENCOUNTER — OFFICE VISIT (OUTPATIENT)
Dept: INTERNAL MEDICINE | Facility: CLINIC | Age: 60
End: 2025-02-14
Payer: COMMERCIAL

## 2025-02-14 VITALS
SYSTOLIC BLOOD PRESSURE: 120 MMHG | BODY MASS INDEX: 21.38 KG/M2 | WEIGHT: 133 LBS | RESPIRATION RATE: 16 BRPM | DIASTOLIC BLOOD PRESSURE: 72 MMHG | HEIGHT: 66 IN

## 2025-02-14 DIAGNOSIS — Z13.1 DIABETES MELLITUS SCREENING: ICD-10-CM

## 2025-02-14 DIAGNOSIS — J01.00 ACUTE NON-RECURRENT MAXILLARY SINUSITIS: Primary | ICD-10-CM

## 2025-02-14 DIAGNOSIS — Z12.31 ENCOUNTER FOR SCREENING MAMMOGRAM FOR MALIGNANT NEOPLASM OF BREAST: ICD-10-CM

## 2025-02-14 DIAGNOSIS — M81.0 OSTEOPOROSIS, UNSPECIFIED OSTEOPOROSIS TYPE, UNSPECIFIED PATHOLOGICAL FRACTURE PRESENCE: ICD-10-CM

## 2025-02-14 DIAGNOSIS — E78.5 HYPERLIPIDEMIA, UNSPECIFIED HYPERLIPIDEMIA TYPE: ICD-10-CM

## 2025-02-14 DIAGNOSIS — F41.0 PANIC ATTACKS: ICD-10-CM

## 2025-02-14 RX ORDER — LORAZEPAM 1 MG/1
1 TABLET ORAL EVERY 8 HOURS PRN
Qty: 30 TABLET | Refills: 0 | Status: SHIPPED | OUTPATIENT
Start: 2025-02-14

## 2025-02-14 NOTE — PROGRESS NOTES
"Chief Complaint  Follow-up (7 mo f/u/Sinus pain and pressure )    Subjective        Taras Tovar presents to [facility]    Annual check up  Symptoms are: recurrent.   Onset was at an unknown time.   Symptoms occur: intermittently.  Symptoms include: fatigue and headaches.   Pertinent negative symptoms include no abdominal pain, no anorexia, no joint pain, no change in stool, no chest pain, no chills, no congestion, no cough, no diaphoresis, no fever, no joint swelling, no myalgias, no nausea, no neck pain, no numbness, no rash, no sore throat, no swollen glands, no dysuria, no vertigo, no visual change, no vomiting and no weakness.   Treatment and/or Medications comments include: B12     Here for follow up   Has more sinus pressure congestion over last week, feels c/w prior sinus infections  She has had more stress this month due to loss of her brother 10 years ago, around timing of loss, she uses prn ativan but uses very seldom but last prescription is 3 years old   Otherwise taking meds as prescribed, no other acute concerns today .    Objective   Vital Signs:  /72   Resp 16   Ht 167.6 cm (66\")   Wt 60.3 kg (133 lb)   BMI 21.47 kg/m²   Estimated body mass index is 21.47 kg/m² as calculated from the following:    Height as of this encounter: 167.6 cm (66\").    Weight as of this encounter: 60.3 kg (133 lb).    BMI is within normal parameters. No other follow-up for BMI required.      Physical Exam  Vitals and nursing note reviewed.   Constitutional:       General: She is not in acute distress.     Appearance: Normal appearance.   HENT:      Head: Normocephalic and atraumatic.      Right Ear: External ear normal.      Left Ear: External ear normal.      Nose: Nose normal. No congestion.      Mouth/Throat:      Mouth: Mucous membranes are moist.      Pharynx: No oropharyngeal exudate or posterior oropharyngeal erythema.   Eyes:      Extraocular Movements: " Extraocular movements intact.      Conjunctiva/sclera: Conjunctivae normal.      Pupils: Pupils are equal, round, and reactive to light.   Cardiovascular:      Rate and Rhythm: Normal rate and regular rhythm.      Pulses: Normal pulses.      Heart sounds: Normal heart sounds. No murmur heard.  Pulmonary:      Effort: Pulmonary effort is normal. No respiratory distress.      Breath sounds: Normal breath sounds. No wheezing or rales.   Musculoskeletal:      Cervical back: Normal range of motion.   Skin:     General: Skin is warm.      Capillary Refill: Capillary refill takes less than 2 seconds.   Neurological:      General: No focal deficit present.      Mental Status: She is alert and oriented to person, place, and time. Mental status is at baseline.      Cranial Nerves: No cranial nerve deficit.   Psychiatric:         Mood and Affect: Mood normal.         Behavior: Behavior normal.         Thought Content: Thought content normal.        Result Review :  The following data was reviewed by: David Mejia MD on 02/14/2025:  Common labs          8/12/2024    10:08   Common Labs   Glucose 104    BUN 11    Creatinine 0.65    Sodium 142    Potassium 4.1    Chloride 105    Calcium 9.4      Data reviewed : prior office notes reviewd           Assessment and Plan   Diagnoses and all orders for this visit:    1. Acute non-recurrent maxillary sinusitis (Primary)  -     amoxicillin-clavulanate (AUGMENTIN) 875-125 MG per tablet; Take 1 tablet by mouth 2 (Two) Times a Day.  Dispense: 20 tablet; Refill: 0    2. Encounter for screening mammogram for malignant neoplasm of breast  -     Mammo Screening Digital Tomosynthesis Bilateral With CAD    3. Osteoporosis, unspecified osteoporosis type, unspecified pathological fracture presence  -     DEXA Bone Density Axial  -     CBC w AUTO Differential  -     Comprehensive metabolic panel    4. Panic attacks  -     LORazepam (Ativan) 1 MG tablet; Take 1 tablet by mouth Every 8 (Eight) Hours As  Needed for Anxiety.  Dispense: 30 tablet; Refill: 0    5. Hyperlipidemia, unspecified hyperlipidemia type  -     Comprehensive metabolic panel  -     Lipid panel  -     TSH  -     T4, free    6. Diabetes mellitus screening  -     Hemoglobin A1c           I spent 15 minutes caring for Taras on this date of service. This time includes time spent by me in the following activities:preparing for the visit, reviewing tests, obtaining and/or reviewing a separately obtained history, performing a medically appropriate examination and/or evaluation , counseling and educating the patient/family/caregiver, ordering medications, tests, or procedures, and documenting information in the medical record  Follow Up   F/u 6 months  Patient was given instructions and counseling regarding her condition or for health maintenance advice. Please see specific information pulled into the AVS if appropriate.     David Mejia MD  [facility]  [facility]  Phone: [phone]

## 2025-02-15 LAB
ALBUMIN SERPL-MCNC: 4.5 G/DL (ref 3.5–5.2)
ALBUMIN/GLOB SERPL: 2.1 G/DL
ALP SERPL-CCNC: 105 U/L (ref 39–117)
ALT SERPL-CCNC: 23 U/L (ref 1–33)
AST SERPL-CCNC: 22 U/L (ref 1–32)
BASOPHILS # BLD AUTO: 0.02 10*3/MM3 (ref 0–0.2)
BASOPHILS NFR BLD AUTO: 0.4 % (ref 0–1.5)
BILIRUB SERPL-MCNC: <0.2 MG/DL (ref 0–1.2)
BUN SERPL-MCNC: 12 MG/DL (ref 6–20)
BUN/CREAT SERPL: 17.6 (ref 7–25)
CALCIUM SERPL-MCNC: 10 MG/DL (ref 8.6–10.5)
CHLORIDE SERPL-SCNC: 104 MMOL/L (ref 98–107)
CHOLEST SERPL-MCNC: 247 MG/DL (ref 0–200)
CO2 SERPL-SCNC: 29.6 MMOL/L (ref 22–29)
CREAT SERPL-MCNC: 0.68 MG/DL (ref 0.57–1)
EGFRCR SERPLBLD CKD-EPI 2021: 100.5 ML/MIN/1.73
EOSINOPHIL # BLD AUTO: 0.03 10*3/MM3 (ref 0–0.4)
EOSINOPHIL NFR BLD AUTO: 0.7 % (ref 0.3–6.2)
ERYTHROCYTE [DISTWIDTH] IN BLOOD BY AUTOMATED COUNT: 14.4 % (ref 12.3–15.4)
GLOBULIN SER CALC-MCNC: 2.1 GM/DL
GLUCOSE SERPL-MCNC: 99 MG/DL (ref 65–99)
HBA1C MFR BLD: 5.1 % (ref 4.8–5.6)
HCT VFR BLD AUTO: 37.6 % (ref 34–46.6)
HDLC SERPL-MCNC: 63 MG/DL (ref 40–60)
HGB BLD-MCNC: 12.8 G/DL (ref 12–15.9)
IMM GRANULOCYTES # BLD AUTO: 0.01 10*3/MM3 (ref 0–0.05)
IMM GRANULOCYTES NFR BLD AUTO: 0.2 % (ref 0–0.5)
LDLC SERPL CALC-MCNC: 154 MG/DL (ref 0–100)
LYMPHOCYTES # BLD AUTO: 1.55 10*3/MM3 (ref 0.7–3.1)
LYMPHOCYTES NFR BLD AUTO: 34.7 % (ref 19.6–45.3)
MCH RBC QN AUTO: 29.3 PG (ref 26.6–33)
MCHC RBC AUTO-ENTMCNC: 34 G/DL (ref 31.5–35.7)
MCV RBC AUTO: 86 FL (ref 79–97)
MONOCYTES # BLD AUTO: 0.36 10*3/MM3 (ref 0.1–0.9)
MONOCYTES NFR BLD AUTO: 8.1 % (ref 5–12)
NEUTROPHILS # BLD AUTO: 2.5 10*3/MM3 (ref 1.7–7)
NEUTROPHILS NFR BLD AUTO: 55.9 % (ref 42.7–76)
NRBC BLD AUTO-RTO: 0 /100 WBC (ref 0–0.2)
PLATELET # BLD AUTO: 202 10*3/MM3 (ref 140–450)
POTASSIUM SERPL-SCNC: 4 MMOL/L (ref 3.5–5.2)
PROT SERPL-MCNC: 6.6 G/DL (ref 6–8.5)
RBC # BLD AUTO: 4.37 10*6/MM3 (ref 3.77–5.28)
SODIUM SERPL-SCNC: 143 MMOL/L (ref 136–145)
T4 FREE SERPL-MCNC: 1 NG/DL (ref 0.92–1.68)
TRIGL SERPL-MCNC: 169 MG/DL (ref 0–150)
TSH SERPL DL<=0.005 MIU/L-ACNC: 0.83 UIU/ML (ref 0.27–4.2)
VLDLC SERPL CALC-MCNC: 30 MG/DL (ref 5–40)
WBC # BLD AUTO: 4.47 10*3/MM3 (ref 3.4–10.8)

## 2025-03-06 ENCOUNTER — TELEPHONE (OUTPATIENT)
Dept: INTERNAL MEDICINE | Facility: CLINIC | Age: 60
End: 2025-03-06

## 2025-03-06 RX ORDER — ESCITALOPRAM OXALATE 20 MG/1
20 TABLET ORAL DAILY
OUTPATIENT
Start: 2025-03-06

## 2025-03-06 NOTE — TELEPHONE ENCOUNTER
Caller: Taras Tovar    Relationship: Self    Best call back number: [phone]    What medication are you requesting: butalbital-acetaminophen-caffeine (FIORICET, ESGIC) -40 MG per tablet     If a prescription is needed, what is your preferred pharmacy and phone number: [facility] [address] [phone] [facility] [phone] FX     Additional notes:PATIENT STATES PRESCRIPTION SHOULD SAY 4 TIMES PER DAY AS NEEDED, BUT IS WRITTEN AS 2 TIMES PER DAY AS NEEDED

## 2025-03-06 NOTE — TELEPHONE ENCOUNTER
Caller: Taras Tovar    Relationship: Self    Best call back number: [phone]    Requested Prescriptions:   Requested Prescriptions     Pending Prescriptions Disp Refills    escitalopram (LEXAPRO) 20 MG tablet       Sig: Take 1 tablet by mouth Daily.        Pharmacy where request should be sent: [facility] - [address] [phone] [facility] [phone] FX     Last office visit with prescribing clinician: 2/14/2025   Last telemedicine visit with prescribing clinician: Visit date not found   Next office visit with prescribing clinician: Visit date not found     Lora Canseco   03/06/25 15:35 EST

## 2025-03-10 DIAGNOSIS — G43.809 OTHER MIGRAINE WITHOUT STATUS MIGRAINOSUS, NOT INTRACTABLE: ICD-10-CM

## 2025-03-10 RX ORDER — BUTALBITAL, ACETAMINOPHEN AND CAFFEINE 50; 325; 40 MG/1; MG/1; MG/1
1 TABLET ORAL 2 TIMES DAILY PRN
Qty: 28 TABLET | Refills: 0 | Status: SHIPPED | OUTPATIENT
Start: 2025-03-10 | End: 2025-03-13 | Stop reason: SDUPTHER

## 2025-03-10 RX ORDER — ESCITALOPRAM OXALATE 20 MG/1
20 TABLET ORAL DAILY
Qty: 90 TABLET | Refills: 0 | Status: SHIPPED | OUTPATIENT
Start: 2025-03-10

## 2025-03-12 ENCOUNTER — TELEPHONE (OUTPATIENT)
Dept: INTERNAL MEDICINE | Facility: CLINIC | Age: 60
End: 2025-03-12

## 2025-03-12 NOTE — TELEPHONE ENCOUNTER
Caller: Taras Tovar    Relationship: Self    Best call back number: [phone]     What medication are you requesting: butalbital-acetaminophen-caffeine (FIORICET, ESGIC) -40 MG per tablet     If a prescription is needed, what is your preferred pharmacy and phone number: [facility] - [address] [phone] [facility] [phone]      Additional notes: PATIENT STATES THAT SHE WAS CONTACTED BY THE PHARMACY AND TOLD THAT PRESCRIPTION DID NOT INCLUDE DIRECTIONS STATING IT IS FOR HOME DELIVERY, SO PHARMACY IS NOT ABLE TO DELIVER. REQUESTS TO HAVE PRESCRIPTION UPDATED WITH HOME DELIVERY

## 2025-03-13 DIAGNOSIS — G43.809 OTHER MIGRAINE WITHOUT STATUS MIGRAINOSUS, NOT INTRACTABLE: ICD-10-CM

## 2025-03-13 RX ORDER — BUTALBITAL, ACETAMINOPHEN AND CAFFEINE 50; 325; 40 MG/1; MG/1; MG/1
1 TABLET ORAL 3 TIMES DAILY PRN
Qty: 270 TABLET | Refills: 1 | Status: SHIPPED | OUTPATIENT
Start: 2025-03-13 | End: 2025-03-13 | Stop reason: SDUPTHER

## 2025-03-13 RX ORDER — BUTALBITAL, ACETAMINOPHEN AND CAFFEINE 50; 325; 40 MG/1; MG/1; MG/1
1 TABLET ORAL 3 TIMES DAILY PRN
Qty: 30 TABLET | Refills: 0 | Status: SHIPPED | OUTPATIENT
Start: 2025-03-13

## 2025-03-13 NOTE — TELEPHONE ENCOUNTER
Okay for hub to read*  Taras stopped by the office. Requesting a small fill of butalbital-acetaminophen-caffeine (FIORICET, ESGIC) -40 MG per tablet  be called into her [facility] pharmacy [facility] than [facility]. Also [facility] fill be sent in again, per patient, they have not received correspondence from our office. And that it is supposed to be a 90 day prescription

## 2025-03-13 NOTE — TELEPHONE ENCOUNTER
Hmm I had sent over previously so not sure what happened.   I sent a new script for [facility] days to [facility] and [facility] 10 day supply over to [facility]

## 2025-04-21 DIAGNOSIS — R10.13 DYSPEPSIA: ICD-10-CM

## 2025-04-21 RX ORDER — OMEPRAZOLE 40 MG/1
40 CAPSULE, DELAYED RELEASE ORAL DAILY
Qty: 90 CAPSULE | Refills: 3 | OUTPATIENT
Start: 2025-04-21

## 2025-05-05 ENCOUNTER — APPOINTMENT (OUTPATIENT)
Dept: BONE DENSITY | Facility: HOSPITAL | Age: 60
End: 2025-05-05
Payer: COMMERCIAL

## 2025-05-05 ENCOUNTER — HOSPITAL ENCOUNTER (OUTPATIENT)
Dept: MAMMOGRAPHY | Facility: HOSPITAL | Age: 60
Discharge: HOME | End: 2025-05-05
Payer: COMMERCIAL

## 2025-05-05 PROCEDURE — 77063 BREAST TOMOSYNTHESIS BI: CPT | Performed by: RADIOLOGY

## 2025-05-05 PROCEDURE — 77067 SCR MAMMO BI INCL CAD: CPT

## 2025-05-05 PROCEDURE — 77063 BREAST TOMOSYNTHESIS BI: CPT

## 2025-05-05 PROCEDURE — 77067 SCR MAMMO BI INCL CAD: CPT | Performed by: RADIOLOGY

## 2025-05-05 PROCEDURE — 77080 DXA BONE DENSITY AXIAL: CPT

## 2025-05-07 DIAGNOSIS — M81.0 OSTEOPOROSIS, UNSPECIFIED OSTEOPOROSIS TYPE, UNSPECIFIED PATHOLOGICAL FRACTURE PRESENCE: Primary | ICD-10-CM

## 2025-05-07 RX ORDER — ZOLEDRONIC ACID 0.05 MG/ML
5 INJECTION, SOLUTION INTRAVENOUS ONCE
OUTPATIENT
Start: 2025-07-21

## 2025-05-07 RX ORDER — SODIUM CHLORIDE 9 MG/ML
20 INJECTION, SOLUTION INTRAVENOUS ONCE
OUTPATIENT
Start: 2025-07-21

## 2025-05-22 NOTE — TELEPHONE ENCOUNTER
Rx Refill Note  Requested Prescriptions     Pending Prescriptions Disp Refills    folic acid (FOLVITE) 1 MG tablet 90 tablet 0     Sig: Take 1 tablet by mouth Daily.      Last office visit with prescribing clinician: 2/14/2025   Last telemedicine visit with prescribing clinician: Visit date not found   Next office visit with prescribing clinician: Visit date not found                         Would you like a call back once the refill request has been completed: [] Yes [] No    If the office needs to give you a call back, can they leave a voicemail: [] Yes [] No    Little Woodruff MA  05/22/25, 08:41 EDT

## 2025-05-23 RX ORDER — FOLIC ACID 1 MG/1
1 TABLET ORAL DAILY
Qty: 90 TABLET | Refills: 0 | Status: SHIPPED | OUTPATIENT
Start: 2025-05-23

## 2025-06-03 RX ORDER — ESCITALOPRAM OXALATE 20 MG/1
20 TABLET ORAL DAILY
Qty: 90 TABLET | Refills: 3 | Status: SHIPPED | OUTPATIENT
Start: 2025-06-03

## 2025-08-04 DIAGNOSIS — R10.13 DYSPEPSIA: ICD-10-CM

## 2025-08-04 RX ORDER — SUCRALFATE 1 G/1
1 TABLET ORAL 4 TIMES DAILY
Qty: 360 TABLET | Refills: 3 | OUTPATIENT
Start: 2025-08-04

## 2025-08-13 ENCOUNTER — HOSPITAL ENCOUNTER (OUTPATIENT)
Dept: INFUSION THERAPY | Facility: HOSPITAL | Age: 60
Discharge: HOME | End: 2025-08-13
Admitting: INTERNAL MEDICINE
Payer: COMMERCIAL

## 2025-08-13 VITALS
RESPIRATION RATE: 16 BRPM | OXYGEN SATURATION: 99 % | TEMPERATURE: 98.3 F | HEART RATE: 51 BPM | DIASTOLIC BLOOD PRESSURE: 56 MMHG | SYSTOLIC BLOOD PRESSURE: 118 MMHG

## 2025-08-13 DIAGNOSIS — M81.8 OTHER OSTEOPOROSIS WITHOUT CURRENT PATHOLOGICAL FRACTURE: Primary | ICD-10-CM

## 2025-08-13 LAB
ANION GAP SERPL CALCULATED.3IONS-SCNC: 10.2 MMOL/L (ref 5–15)
BUN SERPL-MCNC: 12.8 MG/DL (ref 8–23)
BUN/CREAT SERPL: 18.3 (ref 7–25)
CALCIUM SPEC-SCNC: 9.4 MG/DL (ref 8.6–10.5)
CHLORIDE SERPL-SCNC: 104 MMOL/L (ref 98–107)
CO2 SERPL-SCNC: 26.8 MMOL/L (ref 22–29)
CREAT SERPL-MCNC: 0.7 MG/DL (ref 0.57–1)
EGFRCR SERPLBLD CKD-EPI 2021: 99.2 ML/MIN/1.73
GLUCOSE SERPL-MCNC: 82 MG/DL (ref 65–99)
MAGNESIUM SERPL-MCNC: 1.9 MG/DL (ref 1.6–2.4)
PHOSPHATE SERPL-MCNC: 3.4 MG/DL (ref 2.5–4.5)
POTASSIUM SERPL-SCNC: 3.9 MMOL/L (ref 3.5–5.2)
SODIUM SERPL-SCNC: 141 MMOL/L (ref 136–145)

## 2025-08-13 PROCEDURE — 80048 BASIC METABOLIC PNL TOTAL CA: CPT | Performed by: INTERNAL MEDICINE

## 2025-08-13 PROCEDURE — 83735 ASSAY OF MAGNESIUM: CPT | Performed by: INTERNAL MEDICINE

## 2025-08-13 PROCEDURE — 36415 COLL VENOUS BLD VENIPUNCTURE: CPT

## 2025-08-13 PROCEDURE — 96374 THER/PROPH/DIAG INJ IV PUSH: CPT

## 2025-08-13 PROCEDURE — 25010000002 ZOLEDRONIC ACID 5 MG/100ML SOLUTION: Performed by: INTERNAL MEDICINE

## 2025-08-13 PROCEDURE — 84100 ASSAY OF PHOSPHORUS: CPT | Performed by: INTERNAL MEDICINE

## 2025-08-13 PROCEDURE — 96365 THER/PROPH/DIAG IV INF INIT: CPT

## 2025-08-13 RX ORDER — ZOLEDRONIC ACID 0.05 MG/ML
5 INJECTION, SOLUTION INTRAVENOUS ONCE
Status: COMPLETED | OUTPATIENT
Start: 2025-08-13 | End: 2025-08-13

## 2025-08-13 RX ORDER — ZOLEDRONIC ACID 0.05 MG/ML
5 INJECTION, SOLUTION INTRAVENOUS ONCE
Status: CANCELLED | OUTPATIENT
Start: 2025-08-13

## 2025-08-13 RX ORDER — SODIUM CHLORIDE 9 MG/ML
20 INJECTION, SOLUTION INTRAVENOUS ONCE
OUTPATIENT
Start: 2025-08-13

## 2025-08-13 RX ADMIN — ZOLEDRONIC ACID 5 MG: 5 INJECTION, SOLUTION INTRAVENOUS at 10:51

## 2025-08-18 RX ORDER — FOLIC ACID 1 MG/1
1000 TABLET ORAL DAILY
Qty: 90 TABLET | Refills: 3 | Status: SHIPPED | OUTPATIENT
Start: 2025-08-18

## 2025-08-21 DIAGNOSIS — G43.809 OTHER MIGRAINE WITHOUT STATUS MIGRAINOSUS, NOT INTRACTABLE: ICD-10-CM

## 2025-08-21 RX ORDER — BUTALBITAL, ACETAMINOPHEN AND CAFFEINE 50; 325; 40 MG/1; MG/1; MG/1
1 TABLET ORAL 3 TIMES DAILY PRN
Qty: 270 TABLET | Refills: 1 | Status: SHIPPED | OUTPATIENT
Start: 2025-08-21

## 2025-08-28 ENCOUNTER — OFFICE VISIT (OUTPATIENT)
Dept: GASTROENTEROLOGY | Facility: CLINIC | Age: 60
End: 2025-08-28
Payer: COMMERCIAL

## 2025-08-28 VITALS
BODY MASS INDEX: 22.4 KG/M2 | HEIGHT: 66 IN | WEIGHT: 139.4 LBS | SYSTOLIC BLOOD PRESSURE: 120 MMHG | DIASTOLIC BLOOD PRESSURE: 60 MMHG

## 2025-08-28 DIAGNOSIS — K21.9 GASTROESOPHAGEAL REFLUX DISEASE, UNSPECIFIED WHETHER ESOPHAGITIS PRESENT: Primary | ICD-10-CM

## 2025-08-28 DIAGNOSIS — G44.029 CHRONIC CLUSTER HEADACHE, NOT INTRACTABLE: ICD-10-CM

## 2025-08-28 DIAGNOSIS — Z86.0101 PERSONAL HISTORY OF ADENOMATOUS AND SERRATED COLON POLYPS: ICD-10-CM

## 2025-08-28 DIAGNOSIS — R10.13 EPIGASTRIC PAIN: ICD-10-CM

## 2025-08-28 DIAGNOSIS — R10.13 DYSPEPSIA: ICD-10-CM

## 2025-08-28 RX ORDER — SUCRALFATE 1 G/1
1 TABLET ORAL 4 TIMES DAILY
Qty: 120 TABLET | Refills: 11 | Status: SHIPPED | OUTPATIENT
Start: 2025-08-28

## 2025-08-28 RX ORDER — SUCRALFATE 1 G/1
1 TABLET ORAL 4 TIMES DAILY
Qty: 120 TABLET | Refills: 11 | Status: SHIPPED | OUTPATIENT
Start: 2025-08-28 | End: 2025-08-28 | Stop reason: SDUPTHER

## 2025-08-28 RX ORDER — OMEPRAZOLE 40 MG/1
40 CAPSULE, DELAYED RELEASE ORAL DAILY
Qty: 90 CAPSULE | Refills: 3 | Status: SHIPPED | OUTPATIENT
Start: 2025-08-28

## (undated) DEVICE — SUCTION CANISTER, 3000CC,SAFELINER: Brand: DEROYAL

## (undated) DEVICE — Device

## (undated) DEVICE — JACKT LAB F/R KNIT CUFF/COLR XLG BLU

## (undated) DEVICE — ADAPT CLN BIOGUARD AIR/H2O DISP

## (undated) DEVICE — VIAL FORMALIN CAP 10P 40ML

## (undated) DEVICE — GLV SURG SENSICARE PI MIC PF SZ7.5 LF STRL

## (undated) DEVICE — KT ORCA ORCAPOD DISP STRL

## (undated) DEVICE — BW-412T DISP COMBO CLEANING BRUSH: Brand: SINGLE USE COMBINATION CLEANING BRUSH

## (undated) DEVICE — SYR LL W/SCALE/MARK 3ML STRL

## (undated) DEVICE — SYR LL 3CC

## (undated) DEVICE — SNAR POLYP SENSATION STDOVL 27 240 BX40

## (undated) DEVICE — GLV SURG SENSICARE PI MIC PF SZ8 LF STRL

## (undated) DEVICE — FRCP BX RADJAW4 NDL 2.8 240CM LG OG BX40

## (undated) DEVICE — THE BITE BLOCK MAXI, LATEX FREE STRAP IS USED TO PROTECT THE ENDOSCOPE INSERTION TUBE FROM BEING BITTEN BY THE PATIENT.

## (undated) DEVICE — TRAP POLYP 4CHAMBER

## (undated) DEVICE — SPNG GZ WOVN 4X4IN 12PLY 10/BX STRL

## (undated) DEVICE — SOL IRR H2O BTL 1000ML STRL

## (undated) DEVICE — LINER SURG CANSTR SXN S/RIGD 1500CC